# Patient Record
Sex: FEMALE | Race: WHITE
[De-identification: names, ages, dates, MRNs, and addresses within clinical notes are randomized per-mention and may not be internally consistent; named-entity substitution may affect disease eponyms.]

---

## 2017-04-10 ENCOUNTER — HOSPITAL ENCOUNTER (OUTPATIENT)
Dept: HOSPITAL 75 - RAD | Age: 72
End: 2017-04-10
Attending: FAMILY MEDICINE
Payer: MEDICARE

## 2017-04-10 DIAGNOSIS — M25.552: ICD-10-CM

## 2017-04-10 DIAGNOSIS — M54.5: Primary | ICD-10-CM

## 2017-04-10 PROCEDURE — 72100 X-RAY EXAM L-S SPINE 2/3 VWS: CPT

## 2017-04-10 PROCEDURE — 72202 X-RAY EXAM SI JOINTS 3/> VWS: CPT

## 2017-04-10 PROCEDURE — 73502 X-RAY EXAM HIP UNI 2-3 VIEWS: CPT

## 2017-04-10 NOTE — DIAGNOSTIC IMAGING REPORT
Three views of the SI joints.



INDICATION: Pain.



FINDINGS: There are mild degenerative sclerotic changes at the

SI joints and vacuum phenomenon. No fracture or dislocation

seen. Overlying metallic foreign bodies are verified by the

x-ray technologist as on the patient's pants.



IMPRESSION: Mild degenerative changes.



Dictated by:



Dictated on workstation # [id number]

## 2017-04-10 NOTE — DIAGNOSTIC IMAGING REPORT
Three views of the lumbar spine.



INDICATION: Back pain.



FINDINGS: The alignment of the posterior spinal line is

satisfactory. Vertebral body heights are preserved. There is

moderate disc height loss at mid lumbar spine levels most

prominent at the L4-L5 and L2-L3 levels. Prominent anterior

osteophytes are seen. No significant posterior osteophytes.

Mild-to-moderate degenerative changes of the SI joints noted.



IMPRESSION: Disc degenerative changes.



Dictated by:



Dictated on workstation # [id number]

## 2017-04-10 NOTE — DIAGNOSTIC IMAGING REPORT
EXAMINATION: Two views of the left hip.



INDICATION: Left hip pain.



FINDINGS: There is no fracture, dislocation or radiopaque

foreign body. Minimal subchondral sclerosis is seen. No

significant osteophyte formation or joint space narrowing. There

are metallic foreign bodies overlapping from the patient's pants

as verified by the x-ray technologist who took the exam.



IMPRESSION: Minimal degenerative changes.



Dictated by:



Dictated on workstation # [id number]

## 2017-05-17 ENCOUNTER — HOSPITAL ENCOUNTER (OUTPATIENT)
Dept: HOSPITAL 75 - RAD | Age: 72
End: 2017-05-17
Attending: NURSE PRACTITIONER
Payer: MEDICARE

## 2017-05-17 DIAGNOSIS — R10.84: Primary | ICD-10-CM

## 2017-05-17 DIAGNOSIS — R14.0: ICD-10-CM

## 2017-05-17 DIAGNOSIS — K57.30: ICD-10-CM

## 2017-05-17 PROCEDURE — 74177 CT ABD & PELVIS W/CONTRAST: CPT

## 2017-05-17 NOTE — DIAGNOSTIC IMAGING REPORT
PROCEDURE: CT abdomen and pelvis with contrast.



TECHNIQUE: Multiple contiguous axial images were obtained through

the abdomen and pelvis after administration of intravenous

contrast. 



INDICATION:  Abdominal pain. Right flank pain.



100 mL of Omnipaque 350 is administered intravenously.



FINDINGS:

The lung bases demonstrate minimal atelectasis bilaterally.



The liver, the gallbladder, the spleen, the adrenals, and the

pancreas appear unremarkable. The abdominal aorta is normal in

caliber with no para-aortic significantly enlarged lymph nodes

seen. The kidneys have symmetric enhancement and contrast

excretion. No hydronephrosis.



There is divarication of the recti with mild bulge along the

anterior abdominal wall without a ventral hernia. There is no

free fluid or fluid collection in the abdomen or pelvis seen.

There is diverticulosis. No significant inflammatory changes are

seen to suggest the presence of diverticulitis. There is no bowel

obstruction. There is suggestion of prior hysterectomy.



The osseous structures demonstrate prominent degenerative changes

of the lower lumbar spine and SI joints.



IMPRESSION: Diverticulosis. No diverticulitis.



Dictated by: 



  Dictated on workstation # [id number]

## 2018-03-11 ENCOUNTER — HOSPITAL ENCOUNTER (EMERGENCY)
Dept: HOSPITAL 75 - ER | Age: 73
Discharge: HOME | End: 2018-03-11
Payer: MEDICARE

## 2018-03-11 VITALS — SYSTOLIC BLOOD PRESSURE: 138 MMHG | DIASTOLIC BLOOD PRESSURE: 76 MMHG

## 2018-03-11 VITALS — BODY MASS INDEX: 32.44 KG/M2 | HEIGHT: 64 IN | WEIGHT: 190 LBS

## 2018-03-11 DIAGNOSIS — J06.9: Primary | ICD-10-CM

## 2018-03-11 DIAGNOSIS — I10: ICD-10-CM

## 2018-03-11 DIAGNOSIS — Z90.89: ICD-10-CM

## 2018-03-11 DIAGNOSIS — Z90.710: ICD-10-CM

## 2018-03-11 DIAGNOSIS — E78.00: ICD-10-CM

## 2018-03-11 DIAGNOSIS — Z79.52: ICD-10-CM

## 2018-03-11 LAB
ALBUMIN SERPL-MCNC: 4.3 GM/DL (ref 3.2–4.5)
ALP SERPL-CCNC: 116 U/L (ref 40–136)
ALT SERPL-CCNC: 20 U/L (ref 0–55)
BASOPHILS # BLD AUTO: 0 10^3/UL (ref 0–0.1)
BASOPHILS NFR BLD AUTO: 0 % (ref 0–10)
BILIRUB SERPL-MCNC: 0.7 MG/DL (ref 0.1–1)
BUN/CREAT SERPL: 17
CALCIUM SERPL-MCNC: 9.8 MG/DL (ref 8.5–10.1)
CHLORIDE SERPL-SCNC: 104 MMOL/L (ref 98–107)
CO2 SERPL-SCNC: 27 MMOL/L (ref 21–32)
CREAT SERPL-MCNC: 1.05 MG/DL (ref 0.6–1.3)
EOSINOPHIL # BLD AUTO: 0.1 10^3/UL (ref 0–0.3)
EOSINOPHIL NFR BLD AUTO: 2 % (ref 0–10)
ERYTHROCYTE [DISTWIDTH] IN BLOOD BY AUTOMATED COUNT: 13.1 % (ref 10–14.5)
GFR SERPLBLD BASED ON 1.73 SQ M-ARVRAT: 51 ML/MIN
GLUCOSE SERPL-MCNC: 114 MG/DL (ref 70–105)
HCT VFR BLD CALC: 38 % (ref 35–52)
HGB BLD-MCNC: 12.9 G/DL (ref 11.5–16)
LYMPHOCYTES # BLD AUTO: 0.7 X 10^3 (ref 1–4)
LYMPHOCYTES NFR BLD AUTO: 8 % (ref 12–44)
MANUAL DIFFERENTIAL PERFORMED BLD QL: NO
MCH RBC QN AUTO: 30 PG (ref 25–34)
MCHC RBC AUTO-ENTMCNC: 34 G/DL (ref 32–36)
MCV RBC AUTO: 88 FL (ref 80–99)
MONOCYTES # BLD AUTO: 0.6 X 10^3 (ref 0–1)
MONOCYTES NFR BLD AUTO: 7 % (ref 0–12)
NEUTROPHILS # BLD AUTO: 6.8 X 10^3 (ref 1.8–7.8)
NEUTROPHILS NFR BLD AUTO: 83 % (ref 42–75)
PLATELET # BLD: 152 10^3/UL (ref 130–400)
PMV BLD AUTO: 10.8 FL (ref 7.4–10.4)
POTASSIUM SERPL-SCNC: 3.7 MMOL/L (ref 3.6–5)
PROT SERPL-MCNC: 7.8 GM/DL (ref 6.4–8.2)
RBC # BLD AUTO: 4.31 10^6/UL (ref 4.35–5.85)
SODIUM SERPL-SCNC: 140 MMOL/L (ref 135–145)
WBC # BLD AUTO: 8.2 10^3/UL (ref 4.3–11)

## 2018-03-11 PROCEDURE — 71045 X-RAY EXAM CHEST 1 VIEW: CPT

## 2018-03-11 PROCEDURE — 85025 COMPLETE CBC W/AUTO DIFF WBC: CPT

## 2018-03-11 PROCEDURE — 36415 COLL VENOUS BLD VENIPUNCTURE: CPT

## 2018-03-11 PROCEDURE — 87804 INFLUENZA ASSAY W/OPTIC: CPT

## 2018-03-11 PROCEDURE — 80053 COMPREHEN METABOLIC PANEL: CPT

## 2018-03-11 NOTE — XMS REPORT
CCD document using C-CDA

 Created on: 2018



Polly Harden

External Reference #: [id number]

: 1945

Sex: Female



Demographics







 [place]  [address] [place]  [address]

 

 Home Phone  [phone]

 

 Preferred Language  English

 

 Marital Status  Unknown

 

 [employer]ism Affiliation  Unknown

 

 Race  White

 

 Ethnic Group  Not  or 





Author







 Author  Madai Anderson

 

 Organization  Madai Anderson MD, [facility]

 

 Address  [address] [place]  [address]



 

 Phone  [phone]







Care Team Providers







 Care Team Member Name  Role  Phone

 

  PP  Unavailable

 

  CCM  Unavailable



                                            



Summary Purpose

          Interface Exchange                                                   
                 



Insurance Providers

                      





 Payer name                    Policy type / Coverage type                    
Covered party ID                    Effective Begin Date                    
Effective End Date                

 

 [employer] Medicare Part B                    Medicare Part B                    
[id number]                    Unknown                    Unknown                

 

 [employer]                    Medicare Part B                    
[id number]                    Unknown                    Unknown                



                                                                               
         



Family history

                      



Mother            





 Diagnosis                    Age At Onset                

 

 Diabetes mellitus Type 2                    Unknown                

 

 Stroke                    Unknown                



            



Father            





 Diagnosis                    Age At Onset                

 

 Heart Attack                    Unknown                



                                                                               
         



Social History

                      





 Social History Element                    Codes                    Description
                    Effective Dates                

 

 Marital status                    Unknown                              
          2015                

 

 Number of children                    Unknown                    2            
        2015                

 

 Tobacco history                    SNOMED CT: 223109960                    
Never smoker                    2015                

 

 Alcohol history                    SNOMED CT: 535309335                    
Never drinks alcohol                    2015                



                                                                               
                                       



Allergies, Adverse Reactions, Alerts

          Allergies, Adverse Reactions, Alerts data not found                  
                                                  



Past Medical History

                      





 Illness                    Codes                    Condition Status          
          Onset Date                    Resolved Date                

 

                     Discoid lupus erythematosus                               
       ICD-9: 695.4

ICD-10: L93.0                    Active                    10/02/2016          
          Unknown                

 

                     Essential (primary) hypertension                          
            ICD-9: 401.1

ICD-10: I10                    Active                    04/10/2017            
        Unknown                

 

                     Encounter for immunization                                
      ICD-9: V03.9

ICD-10: Z23                    Active                    10/16/2017            
        Unknown                

 

                     Mixed hyperlipidemia                                      
ICD-9: 272.2

ICD-10: E78.2                    Active                    04/10/2017          
          Unknown                

 

                     Morbid (severe) obesity due to excess calories            
                          ICD-9: 278.01

ICD-10: E66.01                    Active                    10/16/2017         
           Unknown                

 

                     Other forms of dyspnea                                    
  ICD-9: 786.09

ICD-10: R06.09                    Active                    2017         
           Unknown                

 

                     Generalized abdominal pain                                
      ICD-9: 789.07

ICD-10: R10.84                    Active                    2017         
           Unknown                

 

                     Low back pain                                      ICD-9: 
724.2

ICD-10: M54.5                    Active                    04/10/2017          
          Unknown                

 

                     Other organ or system involvement in systemic lupus 
erythematosus                                      ICD-9: 710.0

ICD-10: M32.19                    Active                    2017         
           Unknown                

 

                     Abnormal uterine and vaginal bleeding, unspecified        
                              ICD-9: 623.8

ICD-10: N93.9                    Active                    2017          
          Unknown                

 

                     Pain in left hip                                      ICD-9
: 719.45

ICD-10: M25.552                    Active                    04/10/2017        
            Unknown                

 

                     Encounter for general adult medical examination without 
abnormal findings                                      ICD-9: V70.9

ICD-10: Z00.00                    Active                    2017         
           Unknown                

 

                     Essential (primary) hypertension                          
            ICD-9: 401.9

ICD-10: I10                    Active                    2015            
        Unknown                

 

                     Systemic lupus erythematosus, unspecified                 
                     ICD-9: 710.0

ICD-10: M32.9                    Active                    2016          
          Unknown                

 

                     Encounter for screening mammogram for malignant neoplasm 
of breast                                      ICD-9: V76.12

ICD-10: Z12.31                    Active                    2017         
           Unknown                

 

                     Encounter for immunization                                
      ICD-9: V03.82

ICD-10: Z23                    Active                    10/02/2016            
        Unknown                

 

                     Dysphonia                                      ICD-9: 
784.42

ICD-10: R49.0                    Active                    2016          
          Unknown                

 

                     Encounter for immunization                                
      ICD-9: V04.81

ICD-10: Z23                    Active                    2016            
        Unknown                

 

                     Other intestinal malabsorption                            
          ICD-9: 579.8

ICD-10: K90.89                    Active                    2016         
           Unknown                

 

                     Other fecal abnormalities                                 
     ICD-9: 787.7

ICD-10: R19.5                    Active                    2016          
          Unknown                

 

                     Intestinal malabsorption, unspecified                     
                 ICD-9: 579.9

ICD-10: K90.9                    Active                    2016          
          Unknown                

 

                     Medication reaction                                      
ICD-9: 995.20                    Active                    2015          
          Unknown                

 

                     Low back pain                                      ICD-9: 
724.2                    Active                    2015                  
  Unknown                

 

                     Sacroiliitis                                      ICD-9: 
720.2                    Active                    2015                  
  Unknown                

 

                     Hypertension                                      Unknown 
                   Active                    2015                    
Unknown                

 

                     ESOPHAGEAL REFLUX                                      ICD-
9: 530.81                    Active                    2015              
      Unknown                

 

                     ESSENTIAL HYPERTENSION                                    
  ICD-9: 401.9                    Active                    2015         
           Unknown                

 

                     Malar rash                                      ICD-9: 
782.1                    Active                    2015                  
  Unknown                

 

                     Rosacea                                      ICD-9: 695.3 
                   Active                    2015                    
Unknown                



                                                                               
                                                                               
                                                                               
                                                                               
                                                    



Problems

                      





 Condition                    Codes                    Effective Dates         
           Condition Status                

 

                     Discoid lupus erythematosus                               
       ICD-9: 695.4

ICD-10: L93.0                    10/02/2016                    Active          
      

 

                     Essential (primary) hypertension                          
            ICD-9: 401.1

ICD-10: I10                    04/10/2017                    Active            
    

 

                     Encounter for immunization                                
      ICD-9: V03.9

ICD-10: Z23                    10/16/2017                    Active            
    

 

                     Mixed hyperlipidemia                                      
ICD-9: 272.2

ICD-10: E78.2                    04/10/2017                    Active          
      

 

                     Morbid (severe) obesity due to excess calories            
                          ICD-9: 278.01

ICD-10: E66.01                    10/16/2017                    Active         
       

 

                     Other forms of dyspnea                                    
  ICD-9: 786.09

ICD-10: R06.09                    2017                    Active         
       

 

                     Generalized abdominal pain                                
      ICD-9: 789.07

ICD-10: R10.84                    2017                    Active         
       

 

                     Low back pain                                      ICD-9: 
724.2

ICD-10: M54.5                    04/10/2017                    Active          
      

 

                     Other organ or system involvement in systemic lupus 
erythematosus                                      ICD-9: 710.0

ICD-10: M32.19                    2017                    Active         
       

 

                     Abnormal uterine and vaginal bleeding, unspecified        
                              ICD-9: 623.8

ICD-10: N93.9                    2017                    Active          
      

 

                     Pain in left hip                                      ICD-9
: 719.45

ICD-10: M25.552                    04/10/2017                    Active        
        

 

                     Encounter for general adult medical examination without 
abnormal findings                                      ICD-9: V70.9

ICD-10: Z00.00                    2017                    Active         
       

 

                     Essential (primary) hypertension                          
            ICD-9: 401.9

ICD-10: I10                    2015                    Active            
    

 

                     Systemic lupus erythematosus, unspecified                 
                     ICD-9: 710.0

ICD-10: M32.9                    2016                    Active          
      

 

                     Encounter for screening mammogram for malignant neoplasm 
of breast                                      ICD-9: V76.12

ICD-10: Z12.31                    2017                    Active         
       

 

                     Encounter for immunization                                
      ICD-9: V03.82

ICD-10: Z23                    10/02/2016                    Active            
    

 

                     Dysphonia                                      ICD-9: 
784.42

ICD-10: R49.0                    2016                    Active          
      

 

                     Encounter for immunization                                
      ICD-9: V04.81

ICD-10: Z23                    2016                    Active            
    

 

                     Other intestinal malabsorption                            
          ICD-9: 579.8

ICD-10: K90.89                    2016                    Active         
       

 

                     Other fecal abnormalities                                 
     ICD-9: 787.7

ICD-10: R19.5                    2016                    Active          
      

 

                     Intestinal malabsorption, unspecified                     
                 ICD-9: 579.9

ICD-10: K90.9                    2016                    Active          
      

 

                     Medication reaction                                      
ICD-9: 995.20                    2015                    Active          
      

 

                     Low back pain                                      ICD-9: 
724.2                    2015                    Active                

 

                     Sacroiliitis                                      ICD-9: 
720.2                    2015                    Active                

 

                     Hypertension                                      Unknown 
                   2015                    Active                

 

                     ESOPHAGEAL REFLUX                                      ICD-
9: 530.81                    2015                    Active              
  

 

                     ESSENTIAL HYPERTENSION                                    
  ICD-9: 401.9                    2015                    Active         
       

 

                     Malar rash                                      ICD-9: 
782.1                    2015                    Active                

 

                     Rosacea                                      ICD-9: 695.3 
                   2015                    Active                



                                                                               
                                                                               
                                                                               
                                                                               
                                                    



Medications

                      





 Medication                    Codes                    Instructions           
         Start Date                    Stop Date                    Status     
               Fill Instructions                

 

                     hydroxychloroquine 200 mg tablet                          
            RxNorm: 894612                    1 Tablet(s) PO BID               
     2017                    Active        
                            

 

                     atenolol 50 mg tablet                                      
RxNorm: 655200                    1 Tablet(s) PO daily                    2018                    Active                   
                 

 

                     sertraline 50 mg tablet                                   
   RxNorm: 244963                    TAKE 1 TABLET EVERY DAY                    
10/13/2017                    10/07/2018                    Active             
                       

 

                     pravastatin 20 mg tablet                                  
    RxNorm: 131074                    1 Tablet(s) PO daily                    04
/10/2017                    2018                    Active               
                     

 

                     ipratropium bromide 0.06 % nasal spray                    
                  RxNorm: 4790822                    2 Spray NASAL daily       
             2017                    Active
                                    

 

                     ipratropium bromide 0.06 % nasal spray                    
                  RxNorm: 0542685                    2 Spray NASAL daily       
             2017                    
Inactive                                    

 

                     losartan 50 mg tablet                                      
RxNorm: 336872                    1 Tablet(s) PO QPM                    2018                    Active                   
                 

 

                     ipratropium bromide 0.06 % nasal spray                    
                  RxNorm: 6902308                    2 Spray NASAL             
       2017                    Inactive    
                                

 

                     atenolol 50 mg tablet                                      
RxNorm: 745298                    1 Tablet(s) PO daily                    2017                    Inactive                 
                   

 

                     isosorbide mononitrate ER 30 mg tablet,extended release 24 
hr                                      RxNorm: 518366                    2 
Tablet(s) PO daily                    10/03/2016                    2017 
                   Inactive                                    

 

                     sertraline 50 mg tablet                                   
   RxNorm: 145326                    1 Tablet(s) PO daily                    2017                    Inactive              
                      

 

                     dicyclomine 10 mg capsule                                 
     RxNorm: 603777                    1 Capsule(s) PO QID as needed abdominal 
pain                    2016                    10/02/2016               
     Inactive                                    

 

                     Questran 4 gram oral powder                               
       RxNorm: 733448                    1 packet PO BID as needed before meals 
to help prevent diarrhea associated with meals                    2016                    Inactive                        
            

 

                     Pepcid AC 20 mg tablet                                    
  RxNorm: 277668                    1 Tablet(s) PO BID                    2016                    Inactive                 
   [SAVINGS FOR NON-COVERED DRUGS -- BIN:[id number], PCN: [id number], Group: XXXXX, ID
# [id number],  Questions: [phone].  THIS IS NOT INSURANCE.]                

 

                     Co Q-10 200 mg capsule                                    
  RxNorm: 367575                    1 Capsule(s) PO daily                    2016                    Inactive              
                      

 

                     metronidazole 0.75 % topical gel                          
            RxNorm: 283358                    1 Application TOP QPM            
        2015                    Inactive   
                 [SAVINGS FOR NON-COVERED DRUGS -- BIN:[id number], PCN: [id number], 
Group: XXXXX, ID# [id number],  Questions: [phone].  THIS IS NOT INSURANCE.]
                

 

                     Vitamin D3 2,000 unit capsule                             
         RxNorm: 822740                    1 Capsule(s) PO daily               
     No Start Date                                         Active              
                      

 

                     tizanidine 2 mg tablet                                    
  RxNorm: 464901                    1 Tablet(s) PO QHS as needed               
     No Start Date                                         Active              
                      

 

                     hydroxychloroquine 200 mg tablet                          
            RxNorm: 777630                    1 Tablet(s) PO BID               
     No Start Date                    2017                    Inactive   
                                 

 

                     pravastatin 20 mg tablet                                  
    RxNorm: 039866                    1 Tablet(s) PO daily                    
No Start Date                    2017                    Inactive        
                            

 

                     atenolol 50 mg tablet                                      
RxNorm: 817721                    1 Tablet(s) PO daily                    No 
Start Date                    2017                    Inactive           
                         

 

                     sertraline 50 mg tablet                                   
   RxNorm: 397591                    1 Tablet(s) PO daily                    No 
Start Date                    2016                    Inactive           
                         

 

                     isosorbide mononitrate ER 30 mg tablet,extended release 24 
hr                                      RxNorm: 341591                    1 
Tablet(s) PO BID                    No Start Date                    2016
                    Inactive                                    

 

                     ipratropium bromide 0.06 % nasal spray                    
                  RxNorm: 6923614                    2 Spray NASAL             
       No Start Date                    2017                    Inactive 
                                   

 

                     lisinopril 20 mg tablet                                   
   RxNorm: 071551                    1 Tablet(s) PO BID                    No 
Start Date                    2016                    Inactive           
                         

 

                     Vitamin B                                      RxNorm:    
                 1 Tablet(s) PO daily                    No Start Date         
           2016                    Inactive                              
      

 

                     isosorbide (bulk) 98 % powder                             
         RxNorm:                     miscellaneous                    No Start 
Date                    2015                    Inactive                 
                   



                                                                               
                                                                               
                                                                               
                                                                               
                      



Medication Administered

          No Medication Administered data                                      
                              



Immunizations

                      





 Vaccine                    Codes                    Date                    
Status                

 

 Influenza                    CVX: 141                    10/16/2017           
         completed                

 

 Pneumococcal (Adult)                    CVX: 133                    10/03/2016
                    completed                

 

 Influenza                    CVX: 141                    2016           
         completed                



                                                                               
                   



Assessments

                      





 Condition                    Codes                    Effective Dates         
       

 

 Essential (primary) hypertension                    ICD-10: I10

ICD-9: 401.1                    2018                

 

 Discoid lupus erythematosus                    ICD-10: L93.0

ICD-9: 695.4                    2018                

 

 Morbid (severe) obesity due to excess calories                    ICD-10: 
E66.01

ICD-9: 278.01                    10/16/2017                

 

 Other forms of dyspnea                    ICD-10: R06.09

ICD-9: 786.09                    10/16/2017                

 

 Mixed hyperlipidemia                    ICD-10: E78.2

ICD-9: 272.2                    10/16/2017                

 

 Encounter for immunization                    ICD-10: Z23

ICD-9: V03.9                    10/16/2017                

 

 Generalized abdominal pain                    ICD-10: R10.84

ICD-9: 789.07                    2017                

 

 Other organ or system involvement in systemic lupus erythematosus             
       ICD-10: M32.19

ICD-9: 710.0                    2017                

 

 Low back pain                    ICD-10: M54.5

ICD-9: 724.2                    2017                

 

 Abnormal uterine and vaginal bleeding, unspecified                    ICD-10: 
N93.9

ICD-9: 623.8                    2017                

 

 Pain in left hip                    ICD-10: M25.552

ICD-9: 719.45                    04/10/2017                

 

 Encounter for general adult medical examination without abnormal findings     
               ICD-10: Z00.00

ICD-9: V70.9                    2017                

 

 Essential (primary) hypertension                    ICD-10: I10

ICD-9: 401.9                    2017                

 

 Encounter for screening mammogram for malignant neoplasm of breast            
        ICD-10: Z12.31

ICD-9: V76.12                    2017                

 

 Encounter for immunization                    ICD-10: Z23

ICD-9: V03.82                    10/03/2016                

 

 Dysphonia                    ICD-10: R49.0

ICD-9: 784.42                    2016                

 

 Other intestinal malabsorption                    ICD-10: K90.89

ICD-9: 579.8                    2016                

 

 Encounter for immunization                    ICD-10: Z23

ICD-9: V04.81                    2016                

 

 Other fecal abnormalities                    ICD-10: R19.5

ICD-9: 787.7                    2016                

 

 Systemic lupus erythematosus, unspecified                    ICD-10: M32.9

ICD-9: 710.0                    2016                

 

 Intestinal malabsorption, unspecified                    ICD-10: K90.9

ICD-9: 579.9                    2016                

 

 ESSENTIAL HYPERTENSION                    ICD-9: 401.9                                    

 

 Medication reaction                    ICD-9: 995.20                    2015                

 

 Low back pain                    ICD-9: 724.2                    2015   
             

 

 Sacroiliitis                    ICD-9: 720.2                    2015    
            

 

 Malar rash                    ICD-9: 782.1                    2015      
          

 

 ESOPHAGEAL REFLUX                    ICD-9: 530.81                    2015                

 

 Rosacea                    ICD-9: 695.3                    2015         
       



                                                                    



Reason For Visit

                      





 Reason For Visit                    Effective Dates                    Notes  
              

 

 dyspnea                    2018                                    

 

 dyspnea                    10/16/2017                                    

 

 dyspnea                    2017                                    

 

 vaginal bleeding                    2017                                
    

 

 dyspnea                    04/10/2017                                    

 

 Annual Medicare Wellness Exam                    2017                   
                 

 

 diarrhea                    2017                                    

 

 diarrhea                    10/03/2016                                    

 

 diarrhea                    2016                                    

 

 diarrhea                    2016                                    

 

 hypertension                    2016                                    

 

 ~generic                    2015                    feels hot all of the 
time                

 

 back pain                    2015                                    

 

 _                    2015                    here to discuss labs.      
           

 

 rash                    2015                    onset fall              
   



                                                                              



Results

                      





 Observation                    Observation Code                    Item       
             Item Code                    Result                    Date       
         

 

 Comp Metabolic                    Cku036                    NA                
                        141 mEq/L                    2017                

 

 Comp Metabolic                    Yxk079                    K                 
                       3.8 mEq/L                    2017                

 

 Comp Metabolic                    Dzz141                    CL                
                        104 mEq/L                    2017                

 

 Comp Metabolic                    Wjz120                    CO2               
                         27.0 mEq/L                    2017              
  

 

 Comp Metabolic                    Vqy567                    ANION GAP         
                               14                     2017                

 

 Comp Metabolic                    Jgo739                    GLUCOSE           
                             84 mg/dL                    2017            
    

 

 Comp Metabolic                    Uqw955                    Creat             
                           1.0 mg/dL                    2017             
   

 

 Comp Metabolic                    Lqm545                    eGFR              
                          58 ml/min/1.73m2                    2017       
         

 

 Comp Metabolic                    Hkc725                    BUN               
                         18 mg/dL                    2017                

 

 Comp Metabolic                    Wdj076                    B/C Ratio         
                               18.0 Ratio                    2017        
        

 

 Comp Metabolic                    Ksq718                    CALCIUM           
                             9.1 mg/dL                    2017           
     

 

 Comp Metabolic                    Ebg013                    ALK PHOS          
                              92 U/L                    2017             
   

 

 Comp Metabolic                    Ssh626                    AST(SGOT)         
                               28 U/L                    2017            
    

 

 Comp Metabolic                    Khc067                    ALT(SGPT)         
                               17 U/L                    2017            
    

 

 Comp Metabolic                    Obx245                    BILI T            
                            0.7 mg/dL                    2017            
    

 

 Comp Metabolic                    Ssw392                    ALBUMIN           
                             4.1 g/dL                    2017            
    

 

 Comp Metabolic                    Ooj244                    TPRO              
                          6.9 g/dL                    2017                

 

 Comp Metabolic                    Rwh227                    GLOB              
                          2.8 g/dL                    2017                

 

 Comp Metabolic                    Dvz661                    A/G Ratio         
                               1.5 Ratio                    2017         
       

 

 Comp Metabolic                    Azd246                    Osmo              
                          282 mOsmo                    2017              
  

 

 LIPID GRP                                        CHOLESTEROL           
                             186 mg/dL                    2017           
     

 

 LIPID GRP                                        Triglyceride          
                              146 mg/dL                    2017          
      

 

 LIPID GRP                                        HDL CHOLESTEROL       
                                 59 mg/dL                    2017        
        

 

 LIPID GRP                    0094449                    Chol/HDL Ratio        
                                3.15 ratio                    2017       
         

 

 LIPID GRP                    7331522                    NON-HDL Chol          
                              127 mg/dL                    2017          
      

 

 LIPID GRP                                        LDL Cholesterol       
                                 98 mg/dL                    2017        
        

 

 TSH                    4500446                    TSH                         
               1.120 uIU/mL                    2017                

 

 CBC                    4833199                    WBC                         
               7.6 10e9/L                    2017                

 

 CBC                    6576454                    RBC                         
               4.14 10e12/L                    2017                

 

 CBC                    9750903                    HEMOGLOBIN                  
                      12.2 g/dL                    2017                

 

 CBC                    0747656                    HEMATOCRIT                  
                      36.6 %                    2017                

 

 CBC                    3158114                    MCV                         
               88.4 fL                    2017                

 

 CBC                    5094558                    MCH                         
               29.5 pg                    2017                

 

 CBC                    6349025                    MCHC                        
                33.3 g/dL                    2017                

 

 CBC                    3293941                    PLATELET COUNT              
                          172 10e9/L                    2017             
   

 

 CBC                    8001389                    Mean Plt Volume             
                           10.8 fL                    2017                

 

 CBC                    5988951                    Neut Auto                   
                     61.5 %                    2017                

 

 CBC                    0988127                    Lymph Auto                  
                      26.5 %                    2017                

 

 CBC                    8728627                    Mono Auto                   
                     8.8 %                    2017                

 

 CBC                    7308209                    Eos Auto                    
                    2.9 %                    2017                

 

 CBC                    7536815                    RDW                         
               13.2 %                    2017                

 

 CBC                    6213734                    Baso Auto                   
                     0.3 %                    2017                

 

 CBC                    0108446                    Neutrophil Abs              
                          4.67 10e9/L                    2017            
    

 

 CBC                    2290581                    Lymphocyte Abs              
                          2.01 10e9/L                    2017            
    

 

 CBC                    7680917                    Monocyte Abs                
                        0.67 10e9/L                    2017              
  

 

 CBC                    3723015                    Eosinophil Abs              
                          0.22 10e9/L                    2017            
    

 

 CBC                    7737879                    RDW-SD                      
                  41.4 fL                    2017                

 

 CBC                    1636442                    Basophil Abs                
                        0.02 10e9/L                    2017              
  

 

 GFR CALC                    0945689                    GFR Non Afr Amr        
                                44 mL/min                    2017        
        

 

 GFR CALC                    7843017                    GFR Afr Amr            
                            53 mL/min                    2017            
    

 

 CHEM 14                    0204005                    AST                     
                   21 U/L                    2017                

 

 CHEM 14                    1165360                    ALT                     
                   13 U/L                    2017                

 

 CHEM 14                    5588897                    BUN                     
                   24 mg/dL                    2017                

 

 CHEM 14                    6613258                    ALBUMIN                 
                       4.3 g/dL                    2017                

 

 CHEM 14                    7221854                    CHLORIDE                
                        105 mmol/L                    2017                

 

 CHEM 14                    4577137                    Bili Total              
                          0.6 mg/dL                    2017              
  

 

 CHEM 14                    7547957                    ALK PHOS                
                        86 U/L                    2017                

 

 CHEM 14                    5393889                    SODIUM                  
                      140 mmol/L                    2017                

 

 CHEM 14                    9560689                    CREATININE              
                          1.21 mg/dL                    2017             
   

 

 CHEM 14                    3738501                    CALCIUM                 
                       9.8 mg/dL                    2017                

 

 CHEM 14                    6346560                    POTASSIUM               
                         3.9 mmol/L                    2017              
  

 

 CHEM 14                    3820872                    TOTAL PROTEIN           
                             7.5 g/dL                    2017            
    

 

 CHEM 14                    9963497                    GLUCOSE                 
                       92 mg/dL                    2017                

 

 CHEM 14                    5705988                    Bicarbonate             
                           27 mmol/L                    2017             
   

 

 CHEM 14                    0037572                    AGAP                    
                    8 mmol/L                    2017                

 

 TSH                    7805029                    TSH                         
               1.394 uIU/ML                    2016                

 

 CBC                    5807894                    WBC                         
               7.7 10e9/L                    2016                

 

 CBC                    5378793                    RBC                         
               4.21 10e12/L                    2016                

 

 CBC                    4839301                    HGB                         
               12.3 g/dL                    2016                

 

 CBC                    2428074                    HCT DET                     
                   37.3 %                    2016                

 

 CBC                    1822339                    MCV                         
               88.6 fL                    2016                

 

 CBC                    2377163                    MCH                         
               29.2 pg                    2016                

 

 CBC                    2550490                    MCHC                        
                33.0 g/dL                    2016                

 

 CBC                    7136583                    PLT                         
               156 10e9/L                    2016                

 

 CBC                    1899925                    MPV                         
               10.6 fL                    2016                

 

 CBC                    4947537                    DEO %                       
                 55.1 %                    2016                

 

 CBC                    7605049                    LY %                        
                35.1 %                    2016                

 

 CBC                    7233748                    MON %                       
                 6.9 %                    2016                

 

 CBC                    6446787                    EOS  %                      
                  2.6 %                    2016                

 

 CBC                    8340072                    BASO %                      
                  0.3 %                    2016                

 

 CBC                    2819627                    RDW                         
               12.9 %                    2016                

 

 CBC                    0763478                    ABS DEO                     
                   4.24 10e9/L                    2016                

 

 CBC                    4306494                    ABS LYMPH                   
                     2.70 10e9/L                    2016                

 

 CBC                    1290414                    ABS MONO                    
                    0.53 10e9/L                    2016                

 

 CBC                    4791360                    ABS EOS                     
                   0.20 10e9/L                    2016                

 

 CBC                    6729671                    ABS BASO                    
                    0.02 10e9/L                    2016                

 

 CBC                    3128114                    RDW-SD                      
                  40.8 fL                    2016                

 

 CHEM 14                    7058298                    AST                     
                   19 U/L                    2016                

 

 CHEM 14                    9551188                    ALT                     
                   10 IU/L                    2016                

 

 CHEM 14                    1453887                    BUN                     
                   18 MG/DL                    2016                

 

 CHEM 14                    2474261                    ALBUMIN                 
                       4.3 GM/DL                    2016                

 

 CHEM 14                    9237049                    CHLORIDE                
                        105 MMOL/L                    2016                

 

 CHEM 14                    9387705                    BILI TOT                
                        0.4 MG/DL                    2016                

 

 CHEM 14                    5242601                    ALK PHOS                
                        84 U/L                    2016                

 

 CHEM 14                    8787217                    SODIUM                  
                      141 MMOL/L                    2016                

 

 CHEM 14                    4382493                    CREATININE              
                          1.12 MG/DL                    2016             
   

 

 CHEM 14                    3442163                    CALCIUM                 
                       9.5 MG/DL                    2016                

 

 CHEM 14                    4447905                    POTASSIUM               
                         3.6 MMOL/L                    2016              
  

 

 CHEM 14                    9191869                    PROT TOT                
                        7.5 GM/DL                    2016                

 

 CHEM 14                    7698824                    GLUCOSE                 
                       88 MG/DL                    2016                

 

 CHEM 14                    7719954                    BICARB                  
                      29 MMOL/L                    2016                

 

 CHEM 14                    5358669                    ANION GAP               
                         7 MEQ/L                    2016                

 

 LIPID GRP                                        HDL TEST              
                          67 MG/DL                    2016                

 

 LIPID GRP                                        TRIG                  
                      135 MG/DL                    2016                

 

 LIPID GRP                                        TEST LDL              
                          104 MG/DL                    2016              
  

 

 LIPID GRP                                        CHOL                  
                      198 MG/DL                    2016                

 

 LIPID GRP                                        RCHOL/HDL             
                           2.96 RATIO                    2016            
    

 

 LIPID GRP                                        NON-HDL CH            
                            131 MG/DL                    2016            
    

 

 GFR CALC                    0573092                    GFR AA                 
                       58.0L ML/MIN                    2016              
  

 

 GFR CALC                    2910322                    GFR NON-AA             
                           48.0L ML/MIN                    2016          
      

 

 GPI BETA 2                    9124847                    BETA 2 IGG           
                             1.5 SGU                    2015             
   

 

 GPI BETA 2                    4797349                    BETA 2 IGM           
                             2.8 SMU                    2015             
   

 

 CARDIO G/M                    2603396                    CARDIO IGG           
                             5.4 GPLU                    2015            
    

 

 CARDIO G/M                    0120277                    CARDIO IGM           
                             21.8 MPLU                    2015           
     

 

 TITER ADITI                    8538224                    TITR ADITI              
                          1:320                     2015                

 

 TITER ADITI                    [id number]                    PATTERN               
                         HOMOGENS                     2015                

 

 TITER ADITI                    8353417                    ADITI 2                 
                       1:320                     2015                

 

 TITER ADITI                    [id number]                    PATTERN 2             
                           SPECKLED                     2015             
   

 

 C4                    6392135                    C4                           
             39 MG/DL                    2015                

 

 C3                    8446966                    C3                           
             136 MG/DL                    2015                

 

 ADITI SCR                    [id number]                    ADITI SCR                 
                       POSITIVE                     2015                

 

 CHEM 14                    [id number]                    AST                     
                   22 U/L                    2015                

 

 CHEM 14                    [id number]                    ALT                     
                   13 IU/L                    2015                

 

 CHEM 14                    [id number]                    BUN                     
                   19 MG/DL                    2015                

 

 CHEM 14                    [id number]                    ALBUMIN                 
                       4.3 GM/DL                    2015                

 

 CHEM 14                    [id number]                    CHLORIDE                
                        106 MMOL/L                    2015                

 

 CHEM 14                    [id number]                    BILI TOT                
                        0.4 MG/DL                    2015                

 

 CHEM 14                    [id number]                    ALK PHOS                
                        87 U/L                    2015                

 

 CHEM 14                    [id number]                    SODIUM                  
                      140 MMOL/L                    2015                

 

 CHEM 14                    [id number]                    CREATININE              
                          1.02 MG/DL                    2015             
   

 

 CHEM 14                    [id number]                    CALCIUM                 
                       9.1 MG/DL                    2015                

 

 CHEM 14                    [id number]                    POTASSIUM               
                         3.8 MMOL/L                    2015              
  

 

 CHEM 14                    [id number]                    PROT TOT                
                        7.0 GM/DL                    2015                

 

 CHEM 14                    [id number]                    GLUCOSE                 
                       90 MG/DL                    2015                

 

 CHEM 14                    [id number]                    BICARB                  
                      29 MMOL/L                    2015                

 

 CHEM 14                    [id number]                    ANION GAP               
                         5 MEQ/L                    2015                

 

 FREE T4                    [id number]                    FREE T4                 
                       1.05 NG/DL                    2015                

 

 CRP                    [id number]                    CRP                         
               0.6 MG/DL                    2015                

 

 TSH                    [id number]                    TSH                         
               0.609 uIU/ML                    2015                

 

 GFR CALC                    [id number]                    GFR AA                 
                       >60 ML/MIN                    2015                

 

 GFR CALC                    [id number]                    GFR NON-AA             
                           54.0L ML/MIN                    2015          
      

 

 ESR                    [id number]                    ESR                         
               31 MM/HR                    2015                

 

 CBC                    [id number]                    WBC                         
               5.8 10e9/L                    2015                

 

 CBC                    [id number]                    RBC                         
               4.06 10e12/L                    2015                

 

 CBC                    [id number]                    HGB                         
               12.0 g/dL                    2015                

 

 CBC                    [id number]                    HCT DET                     
                   35.8 %                    2015                

 

 CBC                    [id number]                    MCV                         
               88.2 fL                    2015                

 

 CBC                    [id number]                    MCH                         
               29.6 pg                    2015                

 

 CBC                    [id number]                    MCHC                        
                33.5 g/dL                    2015                

 

 CBC                    [id number]                    PLT                         
               164 10e9/L                    2015                

 

 CBC                    [id number]                    MPV                         
               9.9 fL                    2015                

 

 CBC                    [id number]                    DEO %                       
                 57.6 %                    2015                

 

 CBC                    [id number]                    LY %                        
                33.2 %                    2015                

 

 CBC                    [id number]                    MON %                       
                 6.9 %                    2015                

 

 CBC                    [id number]                    EOS  %                      
                  2.1 %                    2015                

 

 CBC                    [id number]                    BASO %                      
                  0.2 %                    2015                

 

 CBC                    [id number]                    RDW                         
               14.2 %                    2015                

 

 CBC                    [id number]                    ABS DEO                     
                   3.34 10e9/L                    2015                

 

 CBC                    [id number]                    ABS LYMPH                   
                     1.93 10e9/L                    2015                

 

 CBC                    [id number]                    ABS MONO                    
                    0.40 10e9/L                    2015                

 

 CBC                    [id number]                    ABS EOS                     
                   0.12 10e9/L                    2015                

 

 CBC                    [id number]                    ABS BASO                    
                    0.01 10e9/L                    2015                

 

 CBC                    [id number]                    RDW-SD                      
                  45.0 fL                    2015                



                                                                               
                                                                               
                                                                               
                                                                       



Review of Systems

                      





 System                    Result                    Effective Dates           
     

 

 Constitutional                    No recent illness                    2018                

 

 Constitutional                    No fatigue                    2018    
            

 

 Constitutional                    No fever                    2018      
          

 

 Constitutional                    No insomnia                    2018   
             

 

 Eyes                    No eye erythema                    2018         
       

 

 Ears/Nose/Throat/Neck                    No headache                    2018                

 

 Cardiovascular                    No chest pain/pressure                                    

 

 Cardiovascular                    No edema                    2018      
          

 

 Cardiovascular                    hypertension                    2018  
              

 

 Cardiovascular                    No near-syncope/dizziness                    
2018                

 

 Cardiovascular                    No syncope                    2018    
            

 

 Respiratory                    No productive sputum                    2018                

 

 Respiratory                    No chest congestion                    2018                

 

 Respiratory                    No chest tightness                    2018                

 

 Respiratory                    No cough                    2018         
       

 

 Respiratory                    No dyspnea                    2018       
         

 

 Gastrointestinal                    No abdominal pain                    2018                

 

 Gastrointestinal                    dyspepsia                    2018   
             

 

 Gastrointestinal                    gastroesophageal reflux                    
2018                

 

 Musculoskeletal                    No joint complaint                    2018                

 

 Neurologic                    No alteration of consciousness                  
  2018                

 

 Psychiatric                    No anxiety                    2018       
         

 

 Psychiatric                    No depression                    2018    
            

 

 Endocrine                    sweating                    2018           
     

 

 Constitutional                    No recent illness                    10/16/
2017                

 

 Constitutional                    No fatigue                    10/16/2017    
            

 

 Constitutional                    No fever                    10/16/2017      
          

 

 Constitutional                    No insomnia                    10/16/2017   
             

 

 Eyes                    No eye erythema                    10/16/2017         
       

 

 Ears/Nose/Throat/Neck                    No headache                    10/16/
2017                

 

 Cardiovascular                    No chest pain/pressure                    10/
                

 

 Cardiovascular                    No edema                    10/16/2017      
          

 

 Cardiovascular                    hypertension                    10/16/2017  
              

 

 Cardiovascular                    No near-syncope/dizziness                    
10/16/2017                

 

 Cardiovascular                    No syncope                    10/16/2017    
            

 

 Respiratory                    No productive sputum                    10/16/
2017                

 

 Respiratory                    No chest congestion                    10/16/
2017                

 

 Respiratory                    No chest tightness                    10/16/
2017                

 

 Respiratory                    No cough                    10/16/2017         
       

 

 Respiratory                    No dyspnea                    10/16/2017       
         

 

 Gastrointestinal                    No abdominal pain                    10/16/
2017                

 

 Gastrointestinal                    dyspepsia                    10/16/2017   
             

 

 Gastrointestinal                    gastroesophageal reflux                    
10/16/2017                

 

 Musculoskeletal                    No joint complaint                    10/16/
2017                

 

 Neurologic                    No alteration of consciousness                  
  10/16/2017                

 

 Psychiatric                    No anxiety                    10/16/2017       
         

 

 Psychiatric                    No depression                    10/16/2017    
            

 

 Endocrine                    sweating                    10/16/2017           
     

 

 Constitutional                    No recent illness                    2017                

 

 Constitutional                    No fatigue                    2017    
            

 

 Constitutional                    No fever                    2017      
          

 

 Constitutional                    No insomnia                    2017   
             

 

 Eyes                    No eye discharge                    2017        
        

 

 Eyes                    No eye erythema                    2017         
       

 

 Ears/Nose/Throat/Neck                    No headache                    2017                

 

 Cardiovascular                    No chest pain/pressure                    2017                

 

 Cardiovascular                    No edema                    2017      
          

 

 Cardiovascular                    hypertension                    2017  
              

 

 Cardiovascular                    No near-syncope/dizziness                    
2017                

 

 Cardiovascular                    No syncope                    2017    
            

 

 Respiratory                    No productive sputum                    2017                

 

 Respiratory                    No chest congestion                    2017                

 

 Respiratory                    No chest tightness                    2017                

 

 Respiratory                    No cough                    2017         
       

 

 Respiratory                    No dyspnea                    2017       
         

 

 Gastrointestinal                    No abdominal pain                    2017                

 

 Gastrointestinal                    dyspepsia                    2017   
             

 

 Gastrointestinal                    gastroesophageal reflux                    
2017                

 

 Musculoskeletal                    No joint complaint                    2017                

 

 Neurologic                    No alteration of consciousness                  
  2017                

 

 Psychiatric                    No anxiety                    2017       
         

 

 Psychiatric                    No depression                    2017    
            

 

 Endocrine                    sweating                    2017           
     

 

 Constitutional                    recent illness                    2017
                

 

 Constitutional                    No anorexia                    2017   
             

 

 Constitutional                    No night sweats                    2017                

 

 Constitutional                    No chills                    2017     
           

 

 Constitutional                    No diaphoresis                    2017
                

 

 Constitutional                    No fatigue                    2017    
            

 

 Constitutional                    No fever                    2017      
          

 

 Constitutional                    No insomnia                    2017   
             

 

 Constitutional                    No malaise                    2017    
            

 

 Constitutional                    No weight loss                    2017
                

 

 Constitutional                    No weight gain                    2017
                

 

 Eyes                    No eye discharge                    2017        
        

 

 Eyes                    No eye erythema                    2017         
       

 

 Ears/Nose/Throat/Neck                    No dizziness                    2017                

 

 Ears/Nose/Throat/Neck                    No headache                    2017                

 

 Cardiovascular                    No chest pain/pressure                    2017                

 

 Cardiovascular                    No dyspnea                    2017    
            

 

 Respiratory                    No cough                    2017         
       

 

 Gastrointestinal                    abdominal pain                    2017                

 

 Gastrointestinal                    No constipation                    2017                

 

 Gastrointestinal                    diarrhea                    2017    
            

 

 Gastrointestinal                    gas and bloating                    2017                

 

 Gastrointestinal                    No vomiting                    2017 
               

 

 Gastrointestinal                    No nausea                    2017   
             

 

 Genitourinary/Nephrology                    No dysuria                                    

 

 Genitourinary/Nephrology                    pelvic pain                    2017                

 

 Genitourinary/Nephrology                    vaginal discharge                 
   2017                

 

 Musculoskeletal                    No joint complaint                    2017                

 

 Dermatologic                    No rash                    2017         
       

 

 Neurologic                    No alteration of consciousness                  
  2017                

 

 Constitutional                    No recent illness                    04/10/
2017                

 

 Constitutional                    No fatigue                    04/10/2017    
            

 

 Constitutional                    No fever                    04/10/2017      
          

 

 Constitutional                    No insomnia                    04/10/2017   
             

 

 Eyes                    No eye discharge                    04/10/2017        
        

 

 Eyes                    No eye erythema                    04/10/2017         
       

 

 Ears/Nose/Throat/Neck                    No headache                    04/10/
2017                

 

 Cardiovascular                    No chest pain/pressure                    04/
10/2017                

 

 Cardiovascular                    No edema                    04/10/2017      
          

 

 Cardiovascular                    hypertension                    04/10/2017  
              

 

 Cardiovascular                    No near-syncope/dizziness                    
04/10/2017                

 

 Cardiovascular                    No syncope                    04/10/2017    
            

 

 Respiratory                    No productive sputum                    04/10/
2017                

 

 Respiratory                    No chest congestion                    04/10/
2017                

 

 Respiratory                    No chest tightness                    04/10/
2017                

 

 Respiratory                    No cough                    04/10/2017         
       

 

 Respiratory                    No dyspnea                    04/10/2017       
         

 

 Gastrointestinal                    No abdominal pain                    04/10/
2017                

 

 Gastrointestinal                    No dyspepsia                    04/10/2017
                

 

 Gastrointestinal                    gastroesophageal reflux                    
04/10/2017                

 

 Musculoskeletal                    joint complaint                    04/10/
2017                

 

 Neurologic                    No alteration of consciousness                  
  04/10/2017                

 

 Psychiatric                    No anxiety                    04/10/2017       
         

 

 Psychiatric                    No depression                    04/10/2017    
            

 

 Endocrine                    sweating                    04/10/2017           
     

 

 Musculoskeletal                    arthralgia(s)                    04/10/2017
                

 

 Musculoskeletal                    stiffness                    04/10/2017    
            

 

 Constitutional                    No recent illness                    2017                

 

 Constitutional                    No chills                    2017     
           

 

 Constitutional                    No diaphoresis                    2017
                

 

 Constitutional                    No fever                    2017      
          

 

 Eyes                    No eye erythema                    2017         
       

 

 Ears/Nose/Throat/Neck                    No nasal allergies                    
2017                

 

 Ears/Nose/Throat/Neck                    No nasal discharge                    
2017                

 

 Cardiovascular                    No chest pain/pressure                                    

 

 Cardiovascular                    No dyspnea                    2017    
            

 

 Respiratory                    No cough                    2017         
       

 

 Respiratory                    No dyspnea                    2017       
         

 

 Gastrointestinal                    No abdominal pain                    2017                

 

 Musculoskeletal                    No joint complaint                    2017                

 

 Dermatologic                    No rash                    2017         
       

 

 Neurologic                    No alteration of consciousness                  
  2017                

 

 Neurologic                    No mental status change                    2017                

 

 Constitutional                    No recent illness                    2017                

 

 Constitutional                    No fatigue                    2017    
            

 

 Constitutional                    No fever                    2017      
          

 

 Constitutional                    No insomnia                    2017   
             

 

 Eyes                    No eye discharge                    2017        
        

 

 Eyes                    No eye erythema                    2017         
       

 

 Ears/Nose/Throat/Neck                    No headache                    2017                

 

 Cardiovascular                    No chest pain/pressure                                    

 

 Cardiovascular                    No edema                    2017      
          

 

 Cardiovascular                    hypertension                    2017  
              

 

 Cardiovascular                    No near-syncope/dizziness                    
2017                

 

 Cardiovascular                    No syncope                    2017    
            

 

 Respiratory                    No productive sputum                    2017                

 

 Respiratory                    No chest congestion                    2017                

 

 Respiratory                    No chest tightness                    2017                

 

 Respiratory                    No cough                    2017         
       

 

 Respiratory                    No dyspnea                    2017       
         

 

 Gastrointestinal                    No abdominal pain                    2017                

 

 Gastrointestinal                    No dyspepsia                    2017
                

 

 Gastrointestinal                    gastroesophageal reflux                    
2017                

 

 Musculoskeletal                    No joint complaint                    2017                

 

 Neurologic                    No alteration of consciousness                  
  2017                

 

 Psychiatric                    No anxiety                    2017       
         

 

 Psychiatric                    No depression                    2017    
            

 

 Endocrine                    sweating                    2017           
     

 

 Constitutional                    No recent illness                    10/03/
2016                

 

 Constitutional                    No fatigue                    10/03/2016    
            

 

 Constitutional                    No fever                    10/03/2016      
          

 

 Constitutional                    No insomnia                    10/03/2016   
             

 

 Eyes                    No eye discharge                    10/03/2016        
        

 

 Eyes                    No eye erythema                    10/03/2016         
       

 

 Ears/Nose/Throat/Neck                    No headache                    10/03/
2016                

 

 Cardiovascular                    No chest pain/pressure                    10/
2016                

 

 Cardiovascular                    No edema                    10/03/2016      
          

 

 Cardiovascular                    hypertension                    10/03/2016  
              

 

 Cardiovascular                    No near-syncope/dizziness                    
10/03/2016                

 

 Cardiovascular                    No syncope                    10/03/2016    
            

 

 Respiratory                    No productive sputum                    10/03/
2016                

 

 Respiratory                    No chest congestion                    10/03/
2016                

 

 Respiratory                    No chest tightness                    10/03/
2016                

 

 Respiratory                    No cough                    10/03/2016         
       

 

 Respiratory                    No dyspnea                    10/03/2016       
         

 

 Gastrointestinal                    No abdominal pain                    10/03/
2016                

 

 Gastrointestinal                    gastroesophageal reflux                    
10/03/2016                

 

 Musculoskeletal                    No joint complaint                    10/03/
2016                

 

 Neurologic                    No alteration of consciousness                  
  10/03/2016                

 

 Psychiatric                    No anxiety                    10/03/2016       
         

 

 Psychiatric                    No depression                    10/03/2016    
            

 

 Endocrine                    sweating                    10/03/2016           
     

 

 Gastrointestinal                    No dyspepsia                    10/03/2016
                

 

 Constitutional                    No recent illness                    2016                

 

 Constitutional                    No fatigue                    2016    
            

 

 Constitutional                    No fever                    2016      
          

 

 Constitutional                    No insomnia                    2016   
             

 

 Eyes                    No eye discharge                    2016        
        

 

 Eyes                    No eye erythema                    2016         
       

 

 Ears/Nose/Throat/Neck                    No headache                    2016                

 

 Cardiovascular                    No chest pain/pressure                                    

 

 Cardiovascular                    No edema                    2016      
          

 

 Cardiovascular                    hypertension                    2016  
              

 

 Cardiovascular                    No near-syncope/dizziness                    
2016                

 

 Cardiovascular                    No syncope                    2016    
            

 

 Respiratory                    No productive sputum                    2016                

 

 Respiratory                    No chest congestion                    2016                

 

 Respiratory                    No chest tightness                    2016                

 

 Respiratory                    No cough                    2016         
       

 

 Respiratory                    No dyspnea                    2016       
         

 

 Gastrointestinal                    No abdominal pain                    2016                

 

 Gastrointestinal                    No dyspepsia                    2016
                

 

 Gastrointestinal                    gastroesophageal reflux                    
2016                

 

 Musculoskeletal                    No joint complaint                    2016                

 

 Neurologic                    No alteration of consciousness                  
  2016                

 

 Psychiatric                    No anxiety                    2016       
         

 

 Psychiatric                    No depression                    2016    
            

 

 Endocrine                    sweating                    2016           
     

 

 Constitutional                    No recent illness                    2016                

 

 Constitutional                    No fatigue                    2016    
            

 

 Constitutional                    No fever                    2016      
          

 

 Constitutional                    No insomnia                    2016   
             

 

 Eyes                    No eye discharge                    2016        
        

 

 Eyes                    No eye erythema                    2016         
       

 

 Ears/Nose/Throat/Neck                    No headache                    2016                

 

 Cardiovascular                    No chest pain/pressure                    2016                

 

 Cardiovascular                    No edema                    2016      
          

 

 Cardiovascular                    hypertension                    2016  
              

 

 Cardiovascular                    No near-syncope/dizziness                    
2016                

 

 Cardiovascular                    No syncope                    2016    
            

 

 Respiratory                    No productive sputum                    2016                

 

 Respiratory                    No chest congestion                    2016                

 

 Respiratory                    No chest tightness                    2016                

 

 Respiratory                    No cough                    2016         
       

 

 Respiratory                    No dyspnea                    2016       
         

 

 Gastrointestinal                    No abdominal pain                    2016                

 

 Gastrointestinal                    No constipation                    2016                

 

 Gastrointestinal                    diarrhea                    2016    
            

 

 Gastrointestinal                    dyspepsia                    2016   
             

 

 Gastrointestinal                    gastroesophageal reflux                    
2016                

 

 Genitourinary/Nephrology                    No breast complaint               
     2016                

 

 Genitourinary/Nephrology                    No dysuria                                    

 

 Genitourinary/Nephrology                    No hematuria                    2016                

 

 Genitourinary/Nephrology                    No menopausal symptoms            
        2016                

 

 Genitourinary/Nephrology                    No nocturia                    2016                

 

 Genitourinary/Nephrology                    No Pap smear abnormality          
          2016                

 

 Genitourinary/Nephrology                    No urinary urgency                
    2016                

 

 Genitourinary/Nephrology                    No urinary frequency              
      2016                

 

 Genitourinary/Nephrology                    No urinary incontinence           
         2016                

 

 Genitourinary/Nephrology                    No vaginal discharge              
      2016                

 

 Musculoskeletal                    No joint complaint                    2016                

 

 Dermatologic                    pigmentation change                    2016                

 

 Dermatologic                    rash                    2016            
    

 

 Neurologic                    No alteration of consciousness                  
  2016                

 

 Psychiatric                    No anxiety                    2016       
         

 

 Psychiatric                    No depression                    2016    
            

 

 Endocrine                    sweating                    2016           
     

 

 Constitutional                    No recent illness                    2016                

 

 Constitutional                    No fatigue                    2016    
            

 

 Constitutional                    No fever                    2016      
          

 

 Constitutional                    No insomnia                    2016   
             

 

 Eyes                    No eye discharge                    2016        
        

 

 Eyes                    No eye erythema                    2016         
       

 

 Ears/Nose/Throat/Neck                    No headache                    2016                

 

 Cardiovascular                    No chest pain/pressure                    2016                

 

 Cardiovascular                    No edema                    2016      
          

 

 Cardiovascular                    hypertension                    2016  
              

 

 Cardiovascular                    No near-syncope/dizziness                    
2016                

 

 Cardiovascular                    No syncope                    2016    
            

 

 Respiratory                    No productive sputum                    2016                

 

 Respiratory                    No chest congestion                    2016                

 

 Respiratory                    No chest tightness                    2016                

 

 Respiratory                    No cough                    2016         
       

 

 Respiratory                    No dyspnea                    2016       
         

 

 Gastrointestinal                    No abdominal pain                    2016                

 

 Gastrointestinal                    No constipation                    2016                

 

 Gastrointestinal                    No diarrhea                    2016 
               

 

 Gastrointestinal                    dyspepsia                    2016   
             

 

 Gastrointestinal                    gastroesophageal reflux                    
2016                

 

 Genitourinary/Nephrology                    No breast complaint               
     2016                

 

 Genitourinary/Nephrology                    No dysuria                                    

 

 Genitourinary/Nephrology                    No hematuria                    2016                

 

 Genitourinary/Nephrology                    No menopausal symptoms            
        2016                

 

 Genitourinary/Nephrology                    No nocturia                    2016                

 

 Genitourinary/Nephrology                    No Pap smear abnormality          
          2016                

 

 Genitourinary/Nephrology                    No urinary urgency                
    2016                

 

 Genitourinary/Nephrology                    No urinary frequency              
      2016                

 

 Genitourinary/Nephrology                    No urinary incontinence           
         2016                

 

 Genitourinary/Nephrology                    No vaginal discharge              
      2016                

 

 Musculoskeletal                    No joint complaint                    2016                

 

 Dermatologic                    pigmentation change                    2016                

 

 Dermatologic                    rash                    2016            
    

 

 Neurologic                    No alteration of consciousness                  
  2016                

 

 Psychiatric                    No anxiety                    2016       
         

 

 Psychiatric                    No depression                    2016    
            

 

 Endocrine                    sweating                    2016           
     

 

 Constitutional                    No recent illness                    2015                

 

 Constitutional                    No fatigue                    2015    
            

 

 Constitutional                    No fever                    2015      
          

 

 Constitutional                    No insomnia                    2015   
             

 

 Eyes                    No eye discharge                    2015        
        

 

 Eyes                    No eye erythema                    2015         
       

 

 Ears/Nose/Throat/Neck                    No headache                    2015                

 

 Cardiovascular                    No chest pain/pressure                    2015                

 

 Cardiovascular                    No edema                    2015      
          

 

 Cardiovascular                    hypertension                    2015  
              

 

 Cardiovascular                    No near-syncope/dizziness                    
2015                

 

 Cardiovascular                    No syncope                    2015    
            

 

 Respiratory                    No productive sputum                    2015                

 

 Respiratory                    No chest congestion                    2015                

 

 Respiratory                    No chest tightness                    2015                

 

 Respiratory                    No cough                    2015         
       

 

 Respiratory                    No dyspnea                    2015       
         

 

 Gastrointestinal                    No abdominal pain                    2015                

 

 Gastrointestinal                    No constipation                    2015                

 

 Gastrointestinal                    No diarrhea                    2015 
               

 

 Gastrointestinal                    dyspepsia                    2015   
             

 

 Gastrointestinal                    gastroesophageal reflux                    
2015                

 

 Genitourinary/Nephrology                    No breast complaint               
     2015                

 

 Genitourinary/Nephrology                    No dysuria                                    

 

 Genitourinary/Nephrology                    No hematuria                    2015                

 

 Genitourinary/Nephrology                    No menopausal symptoms            
        2015                

 

 Genitourinary/Nephrology                    No nocturia                    2015                

 

 Genitourinary/Nephrology                    No Pap smear abnormality          
          2015                

 

 Genitourinary/Nephrology                    No urinary urgency                
    2015                

 

 Genitourinary/Nephrology                    No urinary frequency              
      2015                

 

 Genitourinary/Nephrology                    No urinary incontinence           
         2015                

 

 Genitourinary/Nephrology                    No vaginal discharge              
      2015                

 

 Musculoskeletal                    No joint complaint                    2015                

 

 Dermatologic                    pigmentation change                    2015                

 

 Dermatologic                    rash                    2015            
    

 

 Neurologic                    No alteration of consciousness                  
  2015                

 

 Psychiatric                    No anxiety                    2015       
         

 

 Psychiatric                    No depression                    2015    
            

 

 Endocrine                    sweating                    2015           
     

 

 Constitutional                    No recent illness                    2015                

 

 Constitutional                    No anorexia                    2015   
             

 

 Constitutional                    No night sweats                    2015                

 

 Constitutional                    No chills                    2015     
           

 

 Constitutional                    No diaphoresis                    2015
                

 

 Constitutional                    No fatigue                    2015    
            

 

 Constitutional                    No fever                    2015      
          

 

 Constitutional                    No insomnia                    2015   
             

 

 Constitutional                    No malaise                    2015    
            

 

 Constitutional                    No weight loss                    2015
                

 

 Constitutional                    No weight gain                    2015
                

 

 Eyes                    No eye discharge                    2015        
        

 

 Eyes                    No eye erythema                    2015         
       

 

 Ears/Nose/Throat/Neck                    No headache                    2015                

 

 Respiratory                    No cough                    2015         
       

 

 Gastrointestinal                    No constipation                    2015                

 

 Gastrointestinal                    No diarrhea                    2015 
               

 

 Musculoskeletal                    back pain                    2015    
            

 

 Dermatologic                    No rash                    2015         
       

 

 Dermatologic                    No sores                    2015        
        

 

 Respiratory                    No dyspnea                    2015       
         

 

 Respiratory                    No dyspnea on exertion                    2015                

 

 Constitutional                    No recent illness                    2015                

 

 Constitutional                    No fatigue                    2015    
            

 

 Constitutional                    No fever                    2015      
          

 

 Constitutional                    No insomnia                    2015   
             

 

 Eyes                    No eye discharge                    2015        
        

 

 Eyes                    No eye erythema                    2015         
       

 

 Ears/Nose/Throat/Neck                    No headache                    2015                

 

 Cardiovascular                    No chest pain/pressure                    2015                

 

 Cardiovascular                    No edema                    2015      
          

 

 Cardiovascular                    hypertension                    2015  
              

 

 Cardiovascular                    No near-syncope/dizziness                    
2015                

 

 Cardiovascular                    No syncope                    2015    
            

 

 Respiratory                    No productive sputum                    2015                

 

 Respiratory                    No chest congestion                    2015                

 

 Respiratory                    No chest tightness                    2015                

 

 Respiratory                    No cough                    2015         
       

 

 Respiratory                    No dyspnea                    2015       
         

 

 Gastrointestinal                    No abdominal pain                    2015                

 

 Gastrointestinal                    No constipation                    2015                

 

 Gastrointestinal                    No diarrhea                    2015 
               

 

 Gastrointestinal                    dyspepsia                    2015   
             

 

 Gastrointestinal                    gastroesophageal reflux                    
2015                

 

 Genitourinary/Nephrology                    No breast complaint               
     2015                

 

 Genitourinary/Nephrology                    No dysuria                                    

 

 Genitourinary/Nephrology                    No hematuria                    2015                

 

 Genitourinary/Nephrology                    No menopausal symptoms            
        2015                

 

 Genitourinary/Nephrology                    No nocturia                    2015                

 

 Genitourinary/Nephrology                    No Pap smear abnormality          
          2015                

 

 Genitourinary/Nephrology                    No urinary urgency                
    2015                

 

 Genitourinary/Nephrology                    No urinary frequency              
      2015                

 

 Genitourinary/Nephrology                    No urinary incontinence           
         2015                

 

 Genitourinary/Nephrology                    No vaginal discharge              
      2015                

 

 Musculoskeletal                    No joint complaint                    2015                

 

 Dermatologic                    pigmentation change                    2015                

 

 Dermatologic                    rash                    2015            
    

 

 Neurologic                    No alteration of consciousness                  
  2015                

 

 Psychiatric                    No anxiety                    2015       
         

 

 Psychiatric                    No depression                    2015    
            

 

 Constitutional                    No fatigue                    2015    
            

 

 Constitutional                    No fever                    2015      
          

 

 Constitutional                    No insomnia                    2015   
             

 

 Constitutional                    No recent illness                    2015                

 

 Eyes                    No eye discharge                    2015        
        

 

 Eyes                    No eye erythema                    2015         
       

 

 Ears/Nose/Throat/Neck                    No headache                    2015                

 

 Cardiovascular                    No chest pain/pressure                                    

 

 Cardiovascular                    No edema                    2015      
          

 

 Cardiovascular                    No near-syncope/dizziness                    
2015                

 

 Cardiovascular                    No syncope                    2015    
            

 

 Respiratory                    No chest congestion                    2015                

 

 Respiratory                    No chest tightness                    2015                

 

 Respiratory                    No cough                    2015         
       

 

 Respiratory                    No dyspnea                    2015       
         

 

 Respiratory                    No productive sputum                    2015                

 

 Gastrointestinal                    No abdominal pain                    2015                

 

 Gastrointestinal                    No constipation                    2015                

 

 Gastrointestinal                    No diarrhea                    2015 
               

 

 Genitourinary/Nephrology                    No breast complaint               
     2015                

 

 Genitourinary/Nephrology                    No dysuria                                    

 

 Genitourinary/Nephrology                    No hematuria                                    

 

 Genitourinary/Nephrology                    No urinary frequency              
      2015                

 

 Genitourinary/Nephrology                    No urinary incontinence           
         2015                

 

 Genitourinary/Nephrology                    No urinary urgency                
    2015                

 

 Genitourinary/Nephrology                    No vaginal discharge              
      2015                

 

 Genitourinary/Nephrology                    No menopausal symptoms            
        2015                

 

 Genitourinary/Nephrology                    No nocturia                                    

 

 Genitourinary/Nephrology                    No Pap smear abnormality          
          2015                

 

 Musculoskeletal                    No joint complaint                    2015                

 

 Neurologic                    No alteration of consciousness                  
  2015                

 

 Dermatologic                    rash                    2015            
    

 

 Dermatologic                    pigmentation change                    2015                

 

 Psychiatric                    No anxiety                    2015       
         

 

 Psychiatric                    No depression                    2015    
            

 

 Cardiovascular                    hypertension                    2015  
              

 

 Gastrointestinal                    dyspepsia                    2015   
             

 

 Gastrointestinal                    gastroesophageal reflux                    
2015                



                                                                    



Physical Exam

                      





 Exam Name                    System Name                    Item Name         
           Status                    Result                    Effective Dates 
                   Notes                

 

 Full Exam - General                     Constitutional                     
general appearance                    Development:                    well 
developed                    2018                    None                

 

 Full Exam - General                     Constitutional                     
general appearance                    Development:                    appears 
stated age                    2018                    None                

 

 Full Exam - General                     Constitutional                     
general appearance                    Hygiene/Attention to Grooming:           
         good hygiene                    2018                    None    
            

 

 Full Exam - General                     Eyes                    conjunctiva
/eyelids                    Overall:                    conjunctiva clear      
              2018                    None                

 

 Full Exam - General                     Eyes                    conjunctiva
/eyelids                    Overall:                    cornea clear           
         2018                    None                

 

 Full Exam - General                     Eyes                    conjunctiva
/eyelids                    Overall:                    eyelids normal         
           2018                    None                

 

 Full Exam - General                     Eyes                    pupils and 
irises                    Overall:                    pupils equal, round, 
reactive to light and accomodation                    2018               
     None                

 

 Full Exam - General                     Ears/Nose/Throat                  
  otoscopic exam                    Overall:                    external 
auditory canals clear                    2018                    None    
            

 

 Full Exam - General                     Ears/Nose/Throat                  
  otoscopic exam                    Overall:                    tympanic 
membranes clear                    2018                    None          
      

 

 Full Exam - General                     Ears/Nose/Throat                  
  lips/teeth/gingiva                    Overall:                    benign lips
                    2018                    None                

 

 Full Exam - General                     Ears/Nose/Throat                  
  lips/teeth/gingiva                    Overall:                    normal 
dentition                    2018                    None                

 

 Full Exam - General                     Ears/Nose/Throat                  
  oral cavity/pharynx/larynx                     Overall:                    
oral mucosa clear                    2018                    None        
        

 

 Full Exam - General                     Ears/Nose/Throat                  
  oral cavity/pharynx/larynx                     Overall:                    
oropharyngeal mucosa clear                    2018                    
None                

 

 Full Exam - General                     Ears/Nose/Throat                  
  oral cavity/pharynx/larynx                     Overall:                    
hypopharynx benign                    2018                    None       
         

 

 Full Exam - General                     Ears/Nose/Throat                  
  oral cavity/pharynx/larynx                     Overall:                    no 
masses                    2018                    None                

 

 Full Exam - General                     Respiratory                    
auscultation                    Overall:                    breath sounds clear 
bilaterally                    2018                    None              
  

 

 Full Exam - General                     Respiratory                    
respiratory effort/rhythm                    Overall:                    no 
retractions                    2018                    None              
  

 

 Full Exam - General                     Respiratory                    
respiratory effort/rhythm                    Overall:                    normal 
rate                    2018                    None                

 

 Full Exam - General                     Cardiovascular                    
extremities                    Overall:                    no clubbing         
           2018                    None                

 

 Full Exam - General                     Cardiovascular                    
auscultation of heart                    Overall:                    regular 
rate                    2018                    None                

 

 Full Exam - General                     Cardiovascular                    
auscultation of heart                    Overall:                    normal 
heart sounds                    2018                    None             
   

 

 Full Exam - General                     Abdomen                    
abdominal exam                    Overall:                    no tenderness    
                2018                    None                

 

 Full Exam - General                     Abdomen                    
abdominal exam                    Overall:                    normal bowel 
sounds                    2018                    None                

 

 Full Exam - General                     Musculoskeletal                    
spine, ribs and pelvis                    Overall:                    good 
posture                    2018                    None                

 

 Full Exam - General                     Musculoskeletal                    
head and neck                    Overall:                    head atraumatic   
                 2018                    None                

 

 Full Exam - General                     Musculoskeletal                    
head and neck                    Overall:                    cervical spine 
benign                    2018                    None                

 

 Full Exam - General                     Neurologic                    deep 
tendon reflexes                    Overall:                    deep tendon 
reflexes intact                    2018                    None          
      

 

 Full Exam - General                     Neurologic                    
cranial nerves                    Overall:                    crainial nerves 2 
- 12 grossly intact                    2018                    None      
          

 

 Full Exam - General                     Psychiatric                    
orientation/consciousness                    Overall:                    
oriented to person, place and time                    2018               
     None                

 

 Full Exam - General                     Psychiatric                    
mood and affect                    Overall:                    normal mood and 
affect                    2018                    None                

 

 Full Exam - General                     Constitutional                     
general appearance                    Development:                    well 
developed                    10/16/2017                    None                

 

 Full Exam - General                     Constitutional                     
general appearance                    Development:                    appears 
stated age                    10/16/2017                    None                

 

 Full Exam - General                     Constitutional                     
general appearance                    Hygiene/Attention to Grooming:           
         good hygiene                    10/16/2017                    None    
            

 

 Full Exam - General                     Eyes                    conjunctiva
/eyelids                    Overall:                    conjunctiva clear      
              10/16/2017                    None                

 

 Full Exam - General                     Eyes                    conjunctiva
/eyelids                    Overall:                    cornea clear           
         10/16/2017                    None                

 

 Full Exam - General                     Eyes                    conjunctiva
/eyelids                    Overall:                    eyelids normal         
           10/16/2017                    None                

 

 Full Exam - General                     Eyes                    pupils and 
irises                    Overall:                    pupils equal, round, 
reactive to light and accomodation                    10/16/2017               
     None                

 

 Full Exam - General                     Ears/Nose/Throat                  
  otoscopic exam                    Overall:                    external 
auditory canals clear                    10/16/2017                    None    
            

 

 Full Exam - General                     Ears/Nose/Throat                  
  otoscopic exam                    Overall:                    tympanic 
membranes clear                    10/16/2017                    None          
      

 

 Full Exam - General                     Ears/Nose/Throat                  
  lips/teeth/gingiva                    Overall:                    benign lips
                    10/16/2017                    None                

 

 Full Exam - General                     Ears/Nose/Throat                  
  lips/teeth/gingiva                    Overall:                    normal 
dentition                    10/16/2017                    None                

 

 Full Exam - General                     Ears/Nose/Throat                  
  oral cavity/pharynx/larynx                     Overall:                    
oral mucosa clear                    10/16/2017                    None        
        

 

 Full Exam - General                     Ears/Nose/Throat                  
  oral cavity/pharynx/larynx                     Overall:                    
oropharyngeal mucosa clear                    10/16/2017                    
None                

 

 Full Exam - General                     Ears/Nose/Throat                  
  oral cavity/pharynx/larynx                     Overall:                    
hypopharynx benign                    10/16/2017                    None       
         

 

 Full Exam - General                     Ears/Nose/Throat                  
  oral cavity/pharynx/larynx                     Overall:                    no 
masses                    10/16/2017                    None                

 

 Full Exam - General                     Respiratory                    
auscultation                    Overall:                    breath sounds clear 
bilaterally                    10/16/2017                    None              
  

 

 Full Exam - General                     Respiratory                    
respiratory effort/rhythm                    Overall:                    no 
retractions                    10/16/2017                    None              
  

 

 Full Exam - General                     Respiratory                    
respiratory effort/rhythm                    Overall:                    normal 
rate                    10/16/2017                    None                

 

 Full Exam - General                     Cardiovascular                    
extremities                    Overall:                    no clubbing         
           10/16/2017                    None                

 

 Full Exam - General                     Cardiovascular                    
auscultation of heart                    Overall:                    regular 
rate                    10/16/2017                    None                

 

 Full Exam - General                     Cardiovascular                    
auscultation of heart                    Overall:                    normal 
heart sounds                    10/16/2017                    None             
   

 

 Full Exam - General                     Abdomen                    
abdominal exam                    Overall:                    no tenderness    
                10/16/2017                    None                

 

 Full Exam - General                     Abdomen                    
abdominal exam                    Overall:                    normal bowel 
sounds                    10/16/2017                    None                

 

 Full Exam - General                     Musculoskeletal                    
spine, ribs and pelvis                    Overall:                    good 
posture                    10/16/2017                    None                

 

 Full Exam - General                     Musculoskeletal                    
head and neck                    Overall:                    head atraumatic   
                 10/16/2017                    None                

 

 Full Exam - General                     Musculoskeletal                    
head and neck                    Overall:                    cervical spine 
benign                    10/16/2017                    None                

 

 Full Exam - General                     Neurologic                    deep 
tendon reflexes                    Overall:                    deep tendon 
reflexes intact                    10/16/2017                    None          
      

 

 Full Exam - General                     Neurologic                    
cranial nerves                    Overall:                    crainial nerves 2 
- 12 grossly intact                    10/16/2017                    None      
          

 

 Full Exam - General                     Psychiatric                    
orientation/consciousness                    Overall:                    
oriented to person, place and time                    10/16/2017               
     None                

 

 Full Exam - General                     Psychiatric                    
mood and affect                    Overall:                    normal mood and 
affect                    10/16/2017                    None                

 

 Full Exam - General                     Constitutional                     
general appearance                    Development:                    well 
developed                    2017                    None                

 

 Full Exam - General                     Constitutional                     
general appearance                    Development:                    appears 
stated age                    2017                    None                

 

 Full Exam - General                     Constitutional                     
general appearance                    Hygiene/Attention to Grooming:           
         good hygiene                    2017                    None    
            

 

 Full Exam - General                     Eyes                    conjunctiva
/eyelids                    Overall:                    conjunctiva clear      
              2017                    None                

 

 Full Exam - General                     Eyes                    conjunctiva
/eyelids                    Overall:                    cornea clear           
         2017                    None                

 

 Full Exam - General                     Eyes                    conjunctiva
/eyelids                    Overall:                    eyelids normal         
           2017                    None                

 

 Full Exam - General                     Eyes                    pupils and 
irises                    Overall:                    pupils equal, round, 
reactive to light and accomodation                    2017               
     None                

 

 Full Exam - General                     Ears/Nose/Throat                  
  otoscopic exam                    Overall:                    external 
auditory canals clear                    2017                    None    
            

 

 Full Exam - General                     Ears/Nose/Throat                  
  otoscopic exam                    Overall:                    tympanic 
membranes clear                    2017                    None          
      

 

 Full Exam - General                     Ears/Nose/Throat                  
  lips/teeth/gingiva                    Overall:                    benign lips
                    2017                    None                

 

 Full Exam - General                     Ears/Nose/Throat                  
  lips/teeth/gingiva                    Overall:                    normal 
dentition                    2017                    None                

 

 Full Exam - General                     Ears/Nose/Throat                  
  oral cavity/pharynx/larynx                     Overall:                    
oral mucosa clear                    2017                    None        
        

 

 Full Exam - General                     Ears/Nose/Throat                  
  oral cavity/pharynx/larynx                     Overall:                    
oropharyngeal mucosa clear                    2017                    
None                

 

 Full Exam - General                     Ears/Nose/Throat                  
  oral cavity/pharynx/larynx                     Overall:                    
hypopharynx benign                    2017                    None       
         

 

 Full Exam - General                     Ears/Nose/Throat                  
  oral cavity/pharynx/larynx                     Overall:                    no 
masses                    2017                    None                

 

 Full Exam - General                     Respiratory                    
auscultation                    Overall:                    breath sounds clear 
bilaterally                    2017                    None              
  

 

 Full Exam - General                     Respiratory                    
respiratory effort/rhythm                    Overall:                    no 
retractions                    2017                    None              
  

 

 Full Exam - General                     Respiratory                    
respiratory effort/rhythm                    Overall:                    normal 
rate                    2017                    None                

 

 Full Exam - General                     Cardiovascular                    
extremities                    Overall:                    no clubbing         
           2017                    None                

 

 Full Exam - General                     Cardiovascular                    
auscultation of heart                    Overall:                    regular 
rate                    2017                    None                

 

 Full Exam - General                     Cardiovascular                    
auscultation of heart                    Overall:                    normal 
heart sounds                    2017                    None             
   

 

 Full Exam - General                     Abdomen                    
abdominal exam                    Overall:                    no tenderness    
                2017                    None                

 

 Full Exam - General                     Abdomen                    
abdominal exam                    Overall:                    normal bowel 
sounds                    2017                    None                

 

 Full Exam - General                     Musculoskeletal                    
spine, ribs and pelvis                    Overall:                    good 
posture                    2017                    None                

 

 Full Exam - General                     Musculoskeletal                    
head and neck                    Overall:                    head atraumatic   
                 2017                    None                

 

 Full Exam - General                     Musculoskeletal                    
head and neck                    Overall:                    cervical spine 
benign                    2017                    None                

 

 Full Exam - General                     Neurologic                    deep 
tendon reflexes                    Overall:                    deep tendon 
reflexes intact                    2017                    None          
      

 

 Full Exam - General                     Neurologic                    
cranial nerves                    Overall:                    crainial nerves 2 
- 12 grossly intact                    2017                    None      
          

 

 Full Exam - General                     Psychiatric                    
orientation/consciousness                    Overall:                    
oriented to person, place and time                    2017               
     None                

 

 Full Exam - General                     Psychiatric                    
mood and affect                    Overall:                    normal mood and 
affect                    2017                    None                

 

 Full Exam - General                     Constitutional                     
general appearance                    Development:                    well 
developed                    2017                    None                

 

 Full Exam - General                     Constitutional                     
general appearance                    Development:                    appears 
stated age                    2017                    None                

 

 Full Exam - General                     Constitutional                     
general appearance                    Hygiene/Attention to Grooming:           
         good hygiene                    2017                    None    
            

 

 Full Exam - General                     Eyes                    conjunctiva
/eyelids                    Overall:                    conjunctiva clear      
              2017                    None                

 

 Full Exam - General                     Eyes                    conjunctiva
/eyelids                    Overall:                    cornea clear           
         2017                    None                

 

 Full Exam - General                     Eyes                    conjunctiva
/eyelids                    Overall:                    eyelids normal         
           2017                    None                

 

 Full Exam - General                     Eyes                    pupils and 
irises                    Overall:                    pupils equal, round, 
reactive to light and accomodation                    2017               
     None                

 

 Full Exam - General                     Ears/Nose/Throat                  
  otoscopic exam                    Overall:                    external 
auditory canals clear                    2017                    None    
            

 

 Full Exam - General                     Ears/Nose/Throat                  
  otoscopic exam                    Overall:                    tympanic 
membranes clear                    2017                    None          
      

 

 Full Exam - General                     Ears/Nose/Throat                  
  lips/teeth/gingiva                    Overall:                    benign lips
                    2017                    None                

 

 Full Exam - General                     Ears/Nose/Throat                  
  lips/teeth/gingiva                    Overall:                    normal 
dentition                    2017                    None                

 

 Full Exam - General                     Ears/Nose/Throat                  
  oral cavity/pharynx/larynx                     Overall:                    
oral mucosa clear                    2017                    None        
        

 

 Full Exam - General                     Ears/Nose/Throat                  
  oral cavity/pharynx/larynx                     Overall:                    
oropharyngeal mucosa clear                    2017                    
None                

 

 Full Exam - General                     Ears/Nose/Throat                  
  oral cavity/pharynx/larynx                     Overall:                    
hypopharynx benign                    2017                    None       
         

 

 Full Exam - General                     Ears/Nose/Throat                  
  oral cavity/pharynx/larynx                     Overall:                    no 
masses                    2017                    None                

 

 Full Exam - General                     Respiratory                    
auscultation                    Overall:                    breath sounds clear 
bilaterally                    2017                    None              
  

 

 Full Exam - General                     Respiratory                    
respiratory effort/rhythm                    Overall:                    no 
retractions                    2017                    None              
  

 

 Full Exam - General                     Respiratory                    
respiratory effort/rhythm                    Overall:                    normal 
rate                    2017                    None                

 

 Full Exam - General                     Cardiovascular                    
extremities                    Overall:                    no clubbing         
           2017                    None                

 

 Full Exam - General                     Cardiovascular                    
auscultation of heart                    Overall:                    regular 
rate                    2017                    None                

 

 Full Exam - General                     Cardiovascular                    
auscultation of heart                    Overall:                    normal 
heart sounds                    2017                    None             
   

 

 Full Exam - General                     Abdomen                    
abdominal exam                    Overall:                    no tenderness    
                2017                    None                

 

 Full Exam - General                     Abdomen                    
abdominal exam                    Overall:                    normal bowel 
sounds                    2017                    None                

 

 Full Exam - General                     Musculoskeletal                    
spine, ribs and pelvis                    Overall:                    good 
posture                    2017                    None                

 

 Full Exam - General                     Musculoskeletal                    
head and neck                    Overall:                    head atraumatic   
                 2017                    None                

 

 Full Exam - General                     Musculoskeletal                    
head and neck                    Overall:                    cervical spine 
benign                    2017                    None                

 

 Full Exam - General                     Neurologic                    deep 
tendon reflexes                    Overall:                    deep tendon 
reflexes intact                    2017                    None          
      

 

 Full Exam - General                     Neurologic                    
cranial nerves                    Overall:                    crainial nerves 2 
- 12 grossly intact                    2017                    None      
          

 

 Full Exam - General                     Psychiatric                    
orientation/consciousness                    Overall:                    
oriented to person, place and time                    2017               
     None                

 

 Full Exam - General                     Psychiatric                    
mood and affect                    Overall:                    normal mood and 
affect                    2017                    None                

 

 Full Exam - General                     Genitourinary                    
uterus                    Overall:                    surgically absent        
            2017                    None                

 

 Full Exam - General                     Genitourinary                    
cervix                    Overall:                    surgically absent        
            2017                    None                

 

 Full Exam - General                     Genitourinary                    
labia and vagina                    Labia:                    no lesions 
present                    2017                    None                

 

 Full Exam - General                     Genitourinary                    
labia and vagina                    Vagina:                    erythematous    
                2017                    area of erythema and bleeding in 
vaginal canal at approx 6 o'clock                 

 

 Full Exam - General                     Genitourinary                    
adnexa/parametria                    Overall:                    surgically 
absent                    2017                    None                

 

 Full Exam - General                     Genitourinary                    
urethra                    Overall:                    no masses               
     2017                    None                

 

 Full Exam - General                     Constitutional                     
general appearance                    Development:                    well 
developed                    04/10/2017                    None                

 

 Full Exam - General                     Constitutional                     
general appearance                    Development:                    appears 
stated age                    04/10/2017                    None                

 

 Full Exam - General                     Constitutional                     
general appearance                    Hygiene/Attention to Grooming:           
         good hygiene                    04/10/2017                    None    
            

 

 Full Exam - General                     Eyes                    conjunctiva
/eyelids                    Overall:                    conjunctiva clear      
              04/10/2017                    None                

 

 Full Exam - General                     Eyes                    conjunctiva
/eyelids                    Overall:                    cornea clear           
         04/10/2017                    None                

 

 Full Exam - General                     Eyes                    conjunctiva
/eyelids                    Overall:                    eyelids normal         
           04/10/2017                    None                

 

 Full Exam - General                     Eyes                    pupils and 
irises                    Overall:                    pupils equal, round, 
reactive to light and accomodation                    04/10/2017               
     None                

 

 Full Exam - General                     Ears/Nose/Throat                  
  otoscopic exam                    Overall:                    external 
auditory canals clear                    04/10/2017                    None    
            

 

 Full Exam - General                     Ears/Nose/Throat                  
  otoscopic exam                    Overall:                    tympanic 
membranes clear                    04/10/2017                    None          
      

 

 Full Exam - General                     Ears/Nose/Throat                  
  lips/teeth/gingiva                    Overall:                    benign lips
                    04/10/2017                    None                

 

 Full Exam - General                     Ears/Nose/Throat                  
  lips/teeth/gingiva                    Overall:                    normal 
dentition                    04/10/2017                    None                

 

 Full Exam - General                     Ears/Nose/Throat                  
  oral cavity/pharynx/larynx                     Overall:                    
oral mucosa clear                    04/10/2017                    None        
        

 

 Full Exam - General                     Ears/Nose/Throat                  
  oral cavity/pharynx/larynx                     Overall:                    
oropharyngeal mucosa clear                    04/10/2017                    
None                

 

 Full Exam - General                     Ears/Nose/Throat                  
  oral cavity/pharynx/larynx                     Overall:                    
hypopharynx benign                    04/10/2017                    None       
         

 

 Full Exam - General                     Ears/Nose/Throat                  
  oral cavity/pharynx/larynx                     Overall:                    no 
masses                    04/10/2017                    None                

 

 Full Exam - General                     Respiratory                    
auscultation                    Overall:                    breath sounds clear 
bilaterally                    04/10/2017                    None              
  

 

 Full Exam - General                     Respiratory                    
respiratory effort/rhythm                    Overall:                    no 
retractions                    04/10/2017                    None              
  

 

 Full Exam - General                     Respiratory                    
respiratory effort/rhythm                    Overall:                    normal 
rate                    04/10/2017                    None                

 

 Full Exam - General                     Cardiovascular                    
extremities                    Overall:                    no clubbing         
           04/10/2017                    None                

 

 Full Exam - General                     Cardiovascular                    
auscultation of heart                    Overall:                    regular 
rate                    04/10/2017                    None                

 

 Full Exam - General                     Cardiovascular                    
auscultation of heart                    Overall:                    normal 
heart sounds                    04/10/2017                    None             
   

 

 Full Exam - General                     Abdomen                    
abdominal exam                    Overall:                    no tenderness    
                04/10/2017                    None                

 

 Full Exam - General                     Abdomen                    
abdominal exam                    Overall:                    normal bowel 
sounds                    04/10/2017                    None                

 

 Full Exam - General                     Musculoskeletal                    
spine, ribs and pelvis                    Overall:                    good 
posture                    04/10/2017                    None                

 

 Full Exam - General                     Musculoskeletal                    
head and neck                    Overall:                    head atraumatic   
                 04/10/2017                    None                

 

 Full Exam - General                     Musculoskeletal                    
head and neck                    Overall:                    cervical spine 
benign                    04/10/2017                    None                

 

 Full Exam - General                     Neurologic                    deep 
tendon reflexes                    Overall:                    deep tendon 
reflexes intact                    04/10/2017                    None          
      

 

 Full Exam - General                     Neurologic                    
cranial nerves                    Overall:                    crainial nerves 2 
- 12 grossly intact                    04/10/2017                    None      
          

 

 Full Exam - General                     Psychiatric                    
orientation/consciousness                    Overall:                    
oriented to person, place and time                    04/10/2017               
     None                

 

 Full Exam - General                     Psychiatric                    
mood and affect                    Overall:                    normal mood and 
affect                    04/10/2017                    None                

 

 Full Exam - General                     Constitutional                     
general appearance                    Overall:                    well 
developed                    2017                    None                

 

 Full Exam - General                     Constitutional                     
general appearance                    Overall:                    in no acute 
distress                    2017                    None                

 

 Full Exam - General                     Constitutional                     
general appearance                    Overall:                    well 
nourished                    2017                    None                

 

 Full Exam - General                     Eyes                    conjunctiva
/eyelids                    Overall:                    conjunctiva clear      
              2017                    None                

 

 Full Exam - General                     Eyes                    conjunctiva
/eyelids                    Overall:                    eyelids normal         
           2017                    None                

 

 Full Exam - General                     Ears/Nose/Throat                  
  lips/teeth/gingiva                    Overall:                    benign lips
                    2017                    None                

 

 Full Exam - General                     Ears/Nose/Throat                  
  oral cavity/pharynx/larynx                     Overall:                    
oral mucosa clear                    2017                    None        
        

 

 Full Exam - General                     Respiratory                    
auscultation                    Overall:                    breath sounds clear 
bilaterally                    2017                    None              
  

 

 Full Exam - General                     Respiratory                    
respiratory effort/rhythm                    Overall:                    no 
retractions                    2017                    None              
  

 

 Full Exam - General                     Respiratory                    
respiratory effort/rhythm                    Overall:                    normal 
rate                    2017                    None                

 

 Full Exam - General                     Cardiovascular                    
extremities                    Overall:                    no clubbing         
           2017                    None                

 

 Full Exam - General                     Cardiovascular                    
auscultation of heart                    Overall:                    regular 
rate                    2017                    None                

 

 Full Exam - General                     Cardiovascular                    
auscultation of heart                    Overall:                    normal 
heart sounds                    2017                    None             
   

 

 Full Exam - General                     Musculoskeletal                    
head and neck                    Overall:                    head atraumatic   
                 2017                    None                

 

 Full Exam - General                     Psychiatric                    
orientation/consciousness                    Overall:                    
oriented to person, place and time                    2017               
     None                

 

 Full Exam - General                     Psychiatric                    
mood and affect                    Overall:                    normal mood and 
affect                    2017                    None                

 

 Full Exam - General                     Psychiatric                    
appearance                    Overall:                    well-groomed, good 
eye contact                    2017                    None              
  

 

 Full Exam - General                     Constitutional                     
general appearance                    Development:                    well 
developed                    2017                    None                

 

 Full Exam - General                     Constitutional                     
general appearance                    Development:                    appears 
stated age                    2017                    None                

 

 Full Exam - General                     Constitutional                     
general appearance                    Hygiene/Attention to Grooming:           
         good hygiene                    2017                    None    
            

 

 Full Exam - General                     Eyes                    conjunctiva
/eyelids                    Overall:                    conjunctiva clear      
              2017                    None                

 

 Full Exam - General                     Eyes                    conjunctiva
/eyelids                    Overall:                    cornea clear           
         2017                    None                

 

 Full Exam - General                     Eyes                    conjunctiva
/eyelids                    Overall:                    eyelids normal         
           2017                    None                

 

 Full Exam - General                     Eyes                    pupils and 
irises                    Overall:                    pupils equal, round, 
reactive to light and accomodation                    2017               
     None                

 

 Full Exam - General                     Ears/Nose/Throat                  
  otoscopic exam                    Overall:                    external 
auditory canals clear                    2017                    None    
            

 

 Full Exam - General                     Ears/Nose/Throat                  
  otoscopic exam                    Overall:                    tympanic 
membranes clear                    2017                    None          
      

 

 Full Exam - General                     Ears/Nose/Throat                  
  lips/teeth/gingiva                    Overall:                    benign lips
                    2017                    None                

 

 Full Exam - General 1995                    Ears/Nose/Throat                  
  lips/teeth/gingiva                    Overall:                    normal 
dentition                    2017                    None                

 

 Full Exam - General                     Ears/Nose/Throat                  
  oral cavity/pharynx/larynx                     Overall:                    
oral mucosa clear                    2017                    None        
        

 

 Full Exam - General 1995                    Ears/Nose/Throat                  
  oral cavity/pharynx/larynx                     Overall:                    
oropharyngeal mucosa clear                    2017                    
None                

 

 Full Exam - General                     Ears/Nose/Throat                  
  oral cavity/pharynx/larynx                     Overall:                    
hypopharynx benign                    2017                    None       
         

 

 Full Exam - General                     Ears/Nose/Throat                  
  oral cavity/pharynx/larynx                     Overall:                    no 
masses                    2017                    None                

 

 Full Exam - General                     Respiratory                    
auscultation                    Overall:                    breath sounds clear 
bilaterally                    2017                    None              
  

 

 Full Exam - General                     Respiratory                    
respiratory effort/rhythm                    Overall:                    no 
retractions                    2017                    None              
  

 

 Full Exam - General                     Respiratory                    
respiratory effort/rhythm                    Overall:                    normal 
rate                    2017                    None                

 

 Full Exam - General                     Cardiovascular                    
extremities                    Overall:                    no clubbing         
           2017                    None                

 

 Full Exam - General                     Cardiovascular                    
auscultation of heart                    Overall:                    regular 
rate                    2017                    None                

 

 Full Exam - General                     Cardiovascular                    
auscultation of heart                    Overall:                    normal 
heart sounds                    2017                    None             
   

 

 Full Exam - General                     Abdomen                    
abdominal exam                    Overall:                    no tenderness    
                2017                    None                

 

 Full Exam - General                     Abdomen                    
abdominal exam                    Overall:                    normal bowel 
sounds                    2017                    None                

 

 Full Exam - General                     Musculoskeletal                    
spine, ribs and pelvis                    Overall:                    good 
posture                    2017                    None                

 

 Full Exam - General                     Musculoskeletal                    
head and neck                    Overall:                    head atraumatic   
                 2017                    None                

 

 Full Exam - General                     Musculoskeletal                    
head and neck                    Overall:                    cervical spine 
benign                    2017                    None                

 

 Full Exam - General                     Neurologic                    deep 
tendon reflexes                    Overall:                    deep tendon 
reflexes intact                    2017                    None          
      

 

 Full Exam - General                     Neurologic                    
cranial nerves                    Overall:                    crainial nerves 2 
- 12 grossly intact                    2017                    None      
          

 

 Full Exam - General                     Psychiatric                    
orientation/consciousness                    Overall:                    
oriented to person, place and time                    2017               
     None                

 

 Full Exam - General                     Psychiatric                    
mood and affect                    Overall:                    normal mood and 
affect                    2017                    None                

 

 Full Exam - General                     Constitutional                     
general appearance                    Development:                    well 
developed                    10/03/2016                    None                

 

 Full Exam - General                     Constitutional                     
general appearance                    Development:                    appears 
stated age                    10/03/2016                    None                

 

 Full Exam - General                     Constitutional                     
general appearance                    Hygiene/Attention to Grooming:           
         good hygiene                    10/03/2016                    None    
            

 

 Full Exam - General                     Eyes                    conjunctiva
/eyelids                    Overall:                    conjunctiva clear      
              10/03/2016                    None                

 

 Full Exam - General                     Eyes                    conjunctiva
/eyelids                    Overall:                    cornea clear           
         10/03/2016                    None                

 

 Full Exam - General                     Eyes                    conjunctiva
/eyelids                    Overall:                    eyelids normal         
           10/03/2016                    None                

 

 Full Exam - General                     Eyes                    pupils and 
irises                    Overall:                    pupils equal, round, 
reactive to light and accomodation                    10/03/2016               
     None                

 

 Full Exam - General                     Ears/Nose/Throat                  
  otoscopic exam                    Overall:                    external 
auditory canals clear                    10/03/2016                    None    
            

 

 Full Exam - General                     Ears/Nose/Throat                  
  otoscopic exam                    Overall:                    tympanic 
membranes clear                    10/03/2016                    None          
      

 

 Full Exam - General                     Ears/Nose/Throat                  
  lips/teeth/gingiva                    Overall:                    benign lips
                    10/03/2016                    None                

 

 Full Exam - General                     Ears/Nose/Throat                  
  lips/teeth/gingiva                    Overall:                    normal 
dentition                    10/03/2016                    None                

 

 Full Exam - General                     Ears/Nose/Throat                  
  oral cavity/pharynx/larynx                     Overall:                    
oral mucosa clear                    10/03/2016                    None        
        

 

 Full Exam - General                     Ears/Nose/Throat                  
  oral cavity/pharynx/larynx                     Overall:                    
oropharyngeal mucosa clear                    10/03/2016                    
None                

 

 Full Exam - General                     Ears/Nose/Throat                  
  oral cavity/pharynx/larynx                     Overall:                    
hypopharynx benign                    10/03/2016                    None       
         

 

 Full Exam - General                     Ears/Nose/Throat                  
  oral cavity/pharynx/larynx                     Overall:                    no 
masses                    10/03/2016                    None                

 

 Full Exam - General                     Respiratory                    
auscultation                    Overall:                    breath sounds clear 
bilaterally                    10/03/2016                    None              
  

 

 Full Exam - General                     Respiratory                    
respiratory effort/rhythm                    Overall:                    no 
retractions                    10/03/2016                    None              
  

 

 Full Exam - General                     Respiratory                    
respiratory effort/rhythm                    Overall:                    normal 
rate                    10/03/2016                    None                

 

 Full Exam - General                     Cardiovascular                    
extremities                    Overall:                    no clubbing         
           10/03/2016                    None                

 

 Full Exam - General                     Cardiovascular                    
auscultation of heart                    Overall:                    regular 
rate                    10/03/2016                    None                

 

 Full Exam - General                     Cardiovascular                    
auscultation of heart                    Overall:                    normal 
heart sounds                    10/03/2016                    None             
   

 

 Full Exam - General                     Abdomen                    
abdominal exam                    Overall:                    no tenderness    
                10/03/2016                    None                

 

 Full Exam - General                     Abdomen                    
abdominal exam                    Overall:                    normal bowel 
sounds                    10/03/2016                    None                

 

 Full Exam - General                     Musculoskeletal                    
spine, ribs and pelvis                    Overall:                    good 
posture                    10/03/2016                    None                

 

 Full Exam - General                     Musculoskeletal                    
head and neck                    Overall:                    head atraumatic   
                 10/03/2016                    None                

 

 Full Exam - General                     Musculoskeletal                    
head and neck                    Overall:                    cervical spine 
benign                    10/03/2016                    None                

 

 Full Exam - General                     Neurologic                    deep 
tendon reflexes                    Overall:                    deep tendon 
reflexes intact                    10/03/2016                    None          
      

 

 Full Exam - General                     Neurologic                    
cranial nerves                    Overall:                    crainial nerves 2 
- 12 grossly intact                    10/03/2016                    None      
          

 

 Full Exam - General                     Psychiatric                    
orientation/consciousness                    Overall:                    
oriented to person, place and time                    10/03/2016               
     None                

 

 Full Exam - General                     Psychiatric                    
mood and affect                    Overall:                    normal mood and 
affect                    10/03/2016                    None                

 

 Full Exam - General                     Constitutional                     
general appearance                    Development:                    well 
developed                    2016                    None                

 

 Full Exam - General                     Constitutional                     
general appearance                    Development:                    appears 
stated age                    2016                    None                

 

 Full Exam - General                     Constitutional                     
general appearance                    Hygiene/Attention to Grooming:           
         good hygiene                    2016                    None    
            

 

 Full Exam - General                     Eyes                    conjunctiva
/eyelids                    Overall:                    conjunctiva clear      
              2016                    None                

 

 Full Exam - General                     Eyes                    conjunctiva
/eyelids                    Overall:                    cornea clear           
         2016                    None                

 

 Full Exam - General                     Eyes                    conjunctiva
/eyelids                    Overall:                    eyelids normal         
           2016                    None                

 

 Full Exam - General                     Eyes                    pupils and 
irises                    Overall:                    pupils equal, round, 
reactive to light and accomodation                    2016               
     None                

 

 Full Exam - General                     Ears/Nose/Throat                  
  otoscopic exam                    Overall:                    external 
auditory canals clear                    2016                    None    
            

 

 Full Exam - General                     Ears/Nose/Throat                  
  otoscopic exam                    Overall:                    tympanic 
membranes clear                    2016                    None          
      

 

 Full Exam - General                     Ears/Nose/Throat                  
  lips/teeth/gingiva                    Overall:                    benign lips
                    2016                    None                

 

 Full Exam - General                     Ears/Nose/Throat                  
  lips/teeth/gingiva                    Overall:                    normal 
dentition                    2016                    None                

 

 Full Exam - General                     Ears/Nose/Throat                  
  oral cavity/pharynx/larynx                     Overall:                    
oral mucosa clear                    2016                    None        
        

 

 Full Exam - General                     Ears/Nose/Throat                  
  oral cavity/pharynx/larynx                     Overall:                    
oropharyngeal mucosa clear                    2016                    
None                

 

 Full Exam - General                     Ears/Nose/Throat                  
  oral cavity/pharynx/larynx                     Overall:                    
hypopharynx benign                    2016                    None       
         

 

 Full Exam - General                     Ears/Nose/Throat                  
  oral cavity/pharynx/larynx                     Overall:                    no 
masses                    2016                    None                

 

 Full Exam - General                     Respiratory                    
auscultation                    Overall:                    breath sounds clear 
bilaterally                    2016                    None              
  

 

 Full Exam - General                     Respiratory                    
respiratory effort/rhythm                    Overall:                    no 
retractions                    2016                    None              
  

 

 Full Exam - General                     Respiratory                    
respiratory effort/rhythm                    Overall:                    normal 
rate                    2016                    None                

 

 Full Exam - General                     Cardiovascular                    
extremities                    Overall:                    no clubbing         
           2016                    None                

 

 Full Exam - General                     Cardiovascular                    
auscultation of heart                    Overall:                    regular 
rate                    2016                    None                

 

 Full Exam - General                     Cardiovascular                    
auscultation of heart                    Overall:                    normal 
heart sounds                    2016                    None             
   

 

 Full Exam - General                     Abdomen                    
abdominal exam                    Overall:                    no tenderness    
                2016                    None                

 

 Full Exam - General                     Abdomen                    
abdominal exam                    Overall:                    normal bowel 
sounds                    2016                    None                

 

 Full Exam - General                     Musculoskeletal                    
spine, ribs and pelvis                    Overall:                    good 
posture                    2016                    None                

 

 Full Exam - General                     Musculoskeletal                    
head and neck                    Overall:                    head atraumatic   
                 2016                    None                

 

 Full Exam - General                     Musculoskeletal                    
head and neck                    Overall:                    cervical spine 
benign                    2016                    None                

 

 Full Exam - General                     Neurologic                    deep 
tendon reflexes                    Overall:                    deep tendon 
reflexes intact                    2016                    None          
      

 

 Full Exam - General                     Neurologic                    
cranial nerves                    Overall:                    crainial nerves 2 
- 12 grossly intact                    2016                    None      
          

 

 Full Exam - General                     Psychiatric                    
orientation/consciousness                    Overall:                    
oriented to person, place and time                    2016               
     None                

 

 Full Exam - General                     Psychiatric                    
mood and affect                    Overall:                    normal mood and 
affect                    2016                    None                

 

 Full Exam - General                     Constitutional                     
general appearance                    Development:                    well 
developed                    2016                    None                

 

 Full Exam - General                     Constitutional                     
general appearance                    Development:                    appears 
stated age                    2016                    None                

 

 Full Exam - General                     Constitutional                     
general appearance                    Hygiene/Attention to Grooming:           
         good hygiene                    2016                    None    
            

 

 Full Exam - General                     Eyes                    conjunctiva
/eyelids                    Overall:                    conjunctiva clear      
              2016                    None                

 

 Full Exam - General                     Eyes                    conjunctiva
/eyelids                    Overall:                    cornea clear           
         2016                    None                

 

 Full Exam - General                     Eyes                    conjunctiva
/eyelids                    Overall:                    eyelids normal         
           2016                    None                

 

 Full Exam - General                     Eyes                    pupils and 
irises                    Overall:                    pupils equal, round, 
reactive to light and accomodation                    2016               
     None                

 

 Full Exam - General                     Ears/Nose/Throat                  
  otoscopic exam                    Overall:                    external 
auditory canals clear                    2016                    None    
            

 

 Full Exam - General                     Ears/Nose/Throat                  
  otoscopic exam                    Overall:                    tympanic 
membranes clear                    2016                    None          
      

 

 Full Exam - General                     Ears/Nose/Throat                  
  lips/teeth/gingiva                    Overall:                    benign lips
                    2016                    None                

 

 Full Exam - General                     Ears/Nose/Throat                  
  lips/teeth/gingiva                    Overall:                    normal 
dentition                    2016                    None                

 

 Full Exam - General                     Ears/Nose/Throat                  
  oral cavity/pharynx/larynx                     Overall:                    
oral mucosa clear                    2016                    None        
        

 

 Full Exam - General                     Ears/Nose/Throat                  
  oral cavity/pharynx/larynx                     Overall:                    
oropharyngeal mucosa clear                    2016                    
None                

 

 Full Exam - General                     Ears/Nose/Throat                  
  oral cavity/pharynx/larynx                     Overall:                    
hypopharynx benign                    2016                    None       
         

 

 Full Exam - General                     Ears/Nose/Throat                  
  oral cavity/pharynx/larynx                     Overall:                    no 
masses                    2016                    None                

 

 Full Exam - General                     Respiratory                    
auscultation                    Overall:                    breath sounds clear 
bilaterally                    2016                    None              
  

 

 Full Exam - General                     Respiratory                    
respiratory effort/rhythm                    Overall:                    no 
retractions                    2016                    None              
  

 

 Full Exam - General                     Respiratory                    
respiratory effort/rhythm                    Overall:                    normal 
rate                    2016                    None                

 

 Full Exam - General                     Cardiovascular                    
extremities                    Overall:                    no clubbing         
           2016                    None                

 

 Full Exam - General                     Cardiovascular                    
auscultation of heart                    Overall:                    regular 
rate                    2016                    None                

 

 Full Exam - General                     Cardiovascular                    
auscultation of heart                    Overall:                    normal 
heart sounds                    2016                    None             
   

 

 Full Exam - General                     Abdomen                    
abdominal exam                    Overall:                    no tenderness    
                2016                    None                

 

 Full Exam - General                     Abdomen                    
abdominal exam                    Overall:                    normal bowel 
sounds                    2016                    None                

 

 Full Exam - General                     Musculoskeletal                    
spine, ribs and pelvis                    Overall:                    good 
posture                    2016                    None                

 

 Full Exam - General                     Musculoskeletal                    
head and neck                    Overall:                    head atraumatic   
                 2016                    None                

 

 Full Exam - General                     Musculoskeletal                    
head and neck                    Overall:                    cervical spine 
benign                    2016                    None                

 

 Full Exam - General                     Neurologic                    deep 
tendon reflexes                    Overall:                    deep tendon 
reflexes intact                    2016                    None          
      

 

 Full Exam - General                     Neurologic                    
cranial nerves                    Overall:                    crainial nerves 2 
- 12 grossly intact                    2016                    None      
          

 

 Full Exam - General                     Psychiatric                    
orientation/consciousness                    Overall:                    
oriented to person, place and time                    2016               
     None                

 

 Full Exam - General                     Psychiatric                    
mood and affect                    Overall:                    normal mood and 
affect                    2016                    None                

 

 Full Exam - General                     Constitutional                     
general appearance                    Development:                    well 
developed                    2016                    None                

 

 Full Exam - General                     Constitutional                     
general appearance                    Development:                    appears 
stated age                    2016                    None                

 

 Full Exam - General                     Constitutional                     
general appearance                    Hygiene/Attention to Grooming:           
         good hygiene                    2016                    None    
            

 

 Full Exam - General                     Eyes                    conjunctiva
/eyelids                    Overall:                    conjunctiva clear      
              2016                    None                

 

 Full Exam - General                     Eyes                    conjunctiva
/eyelids                    Overall:                    cornea clear           
         2016                    None                

 

 Full Exam - General                     Eyes                    conjunctiva
/eyelids                    Overall:                    eyelids normal         
           2016                    None                

 

 Full Exam - General                     Eyes                    pupils and 
irises                    Overall:                    pupils equal, round, 
reactive to light and accomodation                    2016               
     None                

 

 Full Exam - General                     Ears/Nose/Throat                  
  otoscopic exam                    Overall:                    external 
auditory canals clear                    2016                    None    
            

 

 Full Exam - General                     Ears/Nose/Throat                  
  otoscopic exam                    Overall:                    tympanic 
membranes clear                    2016                    None          
      

 

 Full Exam - General                     Ears/Nose/Throat                  
  lips/teeth/gingiva                    Overall:                    benign lips
                    2016                    None                

 

 Full Exam - General                     Ears/Nose/Throat                  
  lips/teeth/gingiva                    Overall:                    normal 
dentition                    2016                    None                

 

 Full Exam - General                     Ears/Nose/Throat                  
  oral cavity/pharynx/larynx                     Overall:                    
oral mucosa clear                    2016                    None        
        

 

 Full Exam - General                     Ears/Nose/Throat                  
  oral cavity/pharynx/larynx                     Overall:                    
oropharyngeal mucosa clear                    2016                    
None                

 

 Full Exam - General                     Ears/Nose/Throat                  
  oral cavity/pharynx/larynx                     Overall:                    
hypopharynx benign                    2016                    None       
         

 

 Full Exam - General                     Ears/Nose/Throat                  
  oral cavity/pharynx/larynx                     Overall:                    no 
masses                    2016                    None                

 

 Full Exam - General                     Respiratory                    
auscultation                    Overall:                    breath sounds clear 
bilaterally                    2016                    None              
  

 

 Full Exam - General                     Respiratory                    
respiratory effort/rhythm                    Overall:                    no 
retractions                    2016                    None              
  

 

 Full Exam - General                     Respiratory                    
respiratory effort/rhythm                    Overall:                    normal 
rate                    2016                    None                

 

 Full Exam - General                     Cardiovascular                    
extremities                    Overall:                    no clubbing         
           2016                    None                

 

 Full Exam - General                     Cardiovascular                    
auscultation of heart                    Overall:                    regular 
rate                    2016                    None                

 

 Full Exam - General                     Cardiovascular                    
auscultation of heart                    Overall:                    normal 
heart sounds                    2016                    None             
   

 

 Full Exam - General                     Abdomen                    
abdominal exam                    Overall:                    no tenderness    
                2016                    None                

 

 Full Exam - General                     Abdomen                    
abdominal exam                    Overall:                    normal bowel 
sounds                    2016                    None                

 

 Full Exam - General                     Musculoskeletal                    
head and neck                    Overall:                    head atraumatic   
                 2016                    None                

 

 Full Exam - General                     Musculoskeletal                    
head and neck                    Overall:                    cervical spine 
benign                    2016                    None                

 

 Full Exam - General                     Neurologic                    deep 
tendon reflexes                    Overall:                    deep tendon 
reflexes intact                    2016                    None          
      

 

 Full Exam - General                     Neurologic                    
cranial nerves                    Overall:                    crainial nerves 2 
- 12 grossly intact                    2016                    None      
          

 

 Full Exam - General                     Psychiatric                    
orientation/consciousness                    Overall:                    
oriented to person, place and time                    2016               
     None                

 

 Full Exam - General                     Psychiatric                    
mood and affect                    Overall:                    normal mood and 
affect                    2016                    None                

 

 Full Exam - General                     Musculoskeletal                    
spine, ribs and pelvis                    Overall:                    good 
posture                    2016                    None                

 

 Full Exam - General                     Constitutional                     
general appearance                    Development:                    well 
developed                    2015                    None                

 

 Full Exam - General                     Constitutional                     
general appearance                    Development:                    appears 
stated age                    2015                    None                

 

 Full Exam - General                     Constitutional                     
general appearance                    Hygiene/Attention to Grooming:           
         good hygiene                    2015                    None    
            

 

 Full Exam - General                     Eyes                    conjunctiva
/eyelids                    Overall:                    conjunctiva clear      
              2015                    None                

 

 Full Exam - General                     Eyes                    conjunctiva
/eyelids                    Overall:                    cornea clear           
         2015                    None                

 

 Full Exam - General                     Eyes                    conjunctiva
/eyelids                    Overall:                    eyelids normal         
           2015                    None                

 

 Full Exam - General                     Eyes                    pupils and 
irises                    Overall:                    pupils equal, round, 
reactive to light and accomodation                    2015               
     None                

 

 Full Exam - General                     Ears/Nose/Throat                  
  otoscopic exam                    Overall:                    external 
auditory canals clear                    2015                    None    
            

 

 Full Exam - General                     Ears/Nose/Throat                  
  otoscopic exam                    Overall:                    tympanic 
membranes clear                    2015                    None          
      

 

 Full Exam - General                     Ears/Nose/Throat                  
  lips/teeth/gingiva                    Overall:                    benign lips
                    2015                    None                

 

 Full Exam - General                     Ears/Nose/Throat                  
  lips/teeth/gingiva                    Overall:                    normal 
dentition                    2015                    None                

 

 Full Exam - General                     Ears/Nose/Throat                  
  oral cavity/pharynx/larynx                     Overall:                    
oral mucosa clear                    2015                    None        
        

 

 Full Exam - General                     Ears/Nose/Throat                  
  oral cavity/pharynx/larynx                     Overall:                    
oropharyngeal mucosa clear                    2015                    
None                

 

 Full Exam - General                     Ears/Nose/Throat                  
  oral cavity/pharynx/larynx                     Overall:                    
hypopharynx benign                    2015                    None       
         

 

 Full Exam - General                     Ears/Nose/Throat                  
  oral cavity/pharynx/larynx                     Overall:                    no 
masses                    2015                    None                

 

 Full Exam - General                     Respiratory                    
auscultation                    Overall:                    breath sounds clear 
bilaterally                    2015                    None              
  

 

 Full Exam - General                     Respiratory                    
respiratory effort/rhythm                    Overall:                    no 
retractions                    2015                    None              
  

 

 Full Exam - General                     Respiratory                    
respiratory effort/rhythm                    Overall:                    normal 
rate                    2015                    None                

 

 Full Exam - General                     Cardiovascular                    
extremities                    Overall:                    no clubbing         
           2015                    None                

 

 Full Exam - General                     Cardiovascular                    
auscultation of heart                    Overall:                    regular 
rate                    2015                    None                

 

 Full Exam - General                     Cardiovascular                    
auscultation of heart                    Overall:                    normal 
heart sounds                    2015                    None             
   

 

 Full Exam - General                     Abdomen                    
abdominal exam                    Overall:                    no tenderness    
                2015                    None                

 

 Full Exam - General                     Abdomen                    
abdominal exam                    Overall:                    normal bowel 
sounds                    2015                    None                

 

 Full Exam - General                     Lymphatic                    neck 
nodes                    Overall:                    anterior cervical chain 
benign                    2015                    None                

 

 Full Exam - General                     Lymphatic                    neck 
nodes                    Overall:                    posterior cervical chain 
benign                    2015                    None                

 

 Full Exam - General                     Musculoskeletal                    
spine, ribs and pelvis                    Overall:                    spine 
benign                    2015                    None                

 

 Full Exam - General                     Musculoskeletal                    
spine, ribs and pelvis                    Overall:                    
sacroiliac joint benign                    2015                    None  
              

 

 Full Exam - General                     Musculoskeletal                    
spine, ribs and pelvis                    Overall:                    good 
posture                    2015                    None                

 

 Full Exam - General                     Musculoskeletal                    
gait and station                    Overall:                    normal gait    
                2015                    None                

 

 Full Exam - General                     Musculoskeletal                    
gait and station                    Overall:                    normal station 
                   2015                    None                

 

 Full Exam - General                     Musculoskeletal                    
head and neck                    Overall:                    head atraumatic   
                 2015                    None                

 

 Full Exam - General                     Musculoskeletal                    
head and neck                    Overall:                    cervical spine 
benign                    2015                    None                

 

 Full Exam - General                     Integument                    
inspection of skin                    Dermatitis:                    pustule   
                 2015                    None                

 

 Full Exam - General                     Integument                    
inspection of skin                    Dermatitis:                    petechiae 
                   2015                    None                

 

 Full Exam - General                     Integument                    
inspection of skin                    Dermatitis:                    erythema  
                  2015                    None                

 

 Full Exam - General                     Integument                    
inspection of skin                    Location:                    face        
            2015                    on cheeks bilaterally - across nasal 
bridge to cheeks bilaterally                

 

 Full Exam - General                     Neurologic                    deep 
tendon reflexes                    Overall:                    deep tendon 
reflexes intact                    2015                    None          
      

 

 Full Exam - General                     Neurologic                    
cranial nerves                    Overall:                    crainial nerves 2 
- 12 grossly intact                    2015                    None      
          

 

 Full Exam - General                     Psychiatric                    
orientation/consciousness                    Overall:                    
oriented to person, place and time                    2015               
     None                

 

 Full Exam - General                     Psychiatric                    
mood and affect                    Overall:                    normal mood and 
affect                    2015                    None                

 

 Full Exam - General                     Constitutional                     
general appearance                    Development:                    well 
developed                    2015                    None                

 

 Full Exam - General                     Constitutional                     
general appearance                    Development:                    appears 
stated age                    2015                    None                

 

 Full Exam - General                     Constitutional                     
general appearance                    Hygiene/Attention to Grooming:           
         good hygiene                    2015                    None    
            

 

 Full Exam - General                     Eyes                    conjunctiva
/eyelids                    Overall:                    conjunctiva clear      
              2015                    None                

 

 Full Exam - General                     Eyes                    pupils and 
irises                    Overall:                    pupils equal, round, 
reactive to light and accomodation                    2015               
     None                

 

 Full Exam - General                     Ears/Nose/Throat                  
  otoscopic exam                    Overall:                    external 
auditory canals clear                    2015                    None    
            

 

 Full Exam - General                     Ears/Nose/Throat                  
  otoscopic exam                    Overall:                    tympanic 
membranes clear                    2015                    None          
      

 

 Full Exam - General                     Ears/Nose/Throat                  
  lips/teeth/gingiva                    Overall:                    benign lips
                    2015                    None                

 

 Full Exam - General                     Ears/Nose/Throat                  
  lips/teeth/gingiva                    Overall:                    normal 
dentition                    2015                    None                

 

 Full Exam - General                     Ears/Nose/Throat                  
  oral cavity/pharynx/larynx                     Overall:                    
oral mucosa clear                    2015                    None        
        

 

 Full Exam - General                     Ears/Nose/Throat                  
  oral cavity/pharynx/larynx                     Overall:                    
oropharyngeal mucosa clear                    2015                    
None                

 

 Full Exam - General                     Ears/Nose/Throat                  
  oral cavity/pharynx/larynx                     Overall:                    
hypopharynx benign                    2015                    None       
         

 

 Full Exam - General                     Respiratory                    
auscultation                    Overall:                    breath sounds clear 
bilaterally                    2015                    None              
  

 

 Full Exam - General                     Respiratory                    
respiratory effort/rhythm                    Overall:                    no 
retractions                    2015                    None              
  

 

 Full Exam - General                     Respiratory                    
respiratory effort/rhythm                    Overall:                    normal 
rate                    2015                    None                

 

 Full Exam - General                     Cardiovascular                    
auscultation of heart                    Overall:                    regular 
rate                    2015                    None                

 

 Full Exam - General                     Cardiovascular                    
auscultation of heart                    Overall:                    normal 
heart sounds                    2015                    None             
   

 

 Full Exam - General                     Psychiatric                    
orientation/consciousness                    Overall:                    
oriented to person, place and time                    2015               
     None                

 

 Full Exam - General                     Psychiatric                    
mood and affect                    Overall:                    normal mood and 
affect                    2015                    None                

 

 Full Exam - General                     Musculoskeletal                    
spine, ribs and pelvis                    Sacroiliac joints:                    
tender left sacroiliac joint                    2015                    
None                

 

 Full Exam - General                     Musculoskeletal                    
spine, ribs and pelvis                    Sacroiliac joints:                    
tender right sacroiliac joint                    2015                    
None                

 

 Full Exam - General                     Constitutional                     
general appearance                    Development:                    well 
developed                    2015                    None                

 

 Full Exam - General                     Constitutional                     
general appearance                    Development:                    appears 
stated age                    2015                    None                

 

 Full Exam - General                     Constitutional                     
general appearance                    Hygiene/Attention to Grooming:           
         good hygiene                    2015                    None    
            

 

 Full Exam - General                     Eyes                    conjunctiva
/eyelids                    Overall:                    conjunctiva clear      
              2015                    None                

 

 Full Exam - General                     Eyes                    conjunctiva
/eyelids                    Overall:                    cornea clear           
         2015                    None                

 

 Full Exam - General                     Eyes                    conjunctiva
/eyelids                    Overall:                    eyelids normal         
           2015                    None                

 

 Full Exam - General                     Eyes                    pupils and 
irises                    Overall:                    pupils equal, round, 
reactive to light and accomodation                    2015               
     None                

 

 Full Exam - General                     Ears/Nose/Throat                  
  otoscopic exam                    Overall:                    external 
auditory canals clear                    2015                    None    
            

 

 Full Exam - General                     Ears/Nose/Throat                  
  otoscopic exam                    Overall:                    tympanic 
membranes clear                    2015                    None          
      

 

 Full Exam - General                     Ears/Nose/Throat                  
  lips/teeth/gingiva                    Overall:                    benign lips
                    2015                    None                

 

 Full Exam - General                     Ears/Nose/Throat                  
  lips/teeth/gingiva                    Overall:                    normal 
dentition                    2015                    None                

 

 Full Exam - General                     Ears/Nose/Throat                  
  oral cavity/pharynx/larynx                     Overall:                    
oral mucosa clear                    2015                    None        
        

 

 Full Exam - General                     Ears/Nose/Throat                  
  oral cavity/pharynx/larynx                     Overall:                    
oropharyngeal mucosa clear                    2015                    
None                

 

 Full Exam - General                     Ears/Nose/Throat                  
  oral cavity/pharynx/larynx                     Overall:                    
hypopharynx benign                    2015                    None       
         

 

 Full Exam - General                     Ears/Nose/Throat                  
  oral cavity/pharynx/larynx                     Overall:                    no 
masses                    2015                    None                

 

 Full Exam - General                     Respiratory                    
auscultation                    Overall:                    breath sounds clear 
bilaterally                    2015                    None              
  

 

 Full Exam - General                     Respiratory                    
respiratory effort/rhythm                    Overall:                    no 
retractions                    2015                    None              
  

 

 Full Exam - General                     Respiratory                    
respiratory effort/rhythm                    Overall:                    normal 
rate                    2015                    None                

 

 Full Exam - General                     Cardiovascular                    
extremities                    Overall:                    no clubbing         
           2015                    None                

 

 Full Exam - General                     Cardiovascular                    
auscultation of heart                    Overall:                    regular 
rate                    2015                    None                

 

 Full Exam - General                     Cardiovascular                    
auscultation of heart                    Overall:                    normal 
heart sounds                    2015                    None             
   

 

 Full Exam - General                     Abdomen                    
abdominal exam                    Overall:                    no tenderness    
                2015                    None                

 

 Full Exam - General                     Abdomen                    
abdominal exam                    Overall:                    normal bowel 
sounds                    2015                    None                

 

 Full Exam - General                     Lymphatic                    neck 
nodes                    Overall:                    anterior cervical chain 
benign                    2015                    None                

 

 Full Exam - General                     Lymphatic                    neck 
nodes                    Overall:                    posterior cervical chain 
benign                    2015                    None                

 

 Full Exam - General                     Musculoskeletal                    
spine, ribs and pelvis                    Overall:                    spine 
benign                    2015                    None                

 

 Full Exam - General                     Musculoskeletal                    
spine, ribs and pelvis                    Overall:                    
sacroiliac joint benign                    2015                    None  
              

 

 Full Exam - General                     Musculoskeletal                    
spine, ribs and pelvis                    Overall:                    good 
posture                    2015                    None                

 

 Full Exam - General                     Musculoskeletal                    
gait and station                    Overall:                    normal gait    
                2015                    None                

 

 Full Exam - General                     Musculoskeletal                    
gait and station                    Overall:                    normal station 
                   2015                    None                

 

 Full Exam - General                     Musculoskeletal                    
head and neck                    Overall:                    head atraumatic   
                 2015                    None                

 

 Full Exam - General                     Musculoskeletal                    
head and neck                    Overall:                    cervical spine 
benign                    2015                    None                

 

 Full Exam - General                     Integument                    
inspection of skin                    Dermatitis:                    pustule   
                 2015                    None                

 

 Full Exam - General                     Integument                    
inspection of skin                    Dermatitis:                    petechiae 
                   2015                    None                

 

 Full Exam - General                     Integument                    
inspection of skin                    Dermatitis:                    erythema  
                  2015                    None                

 

 Full Exam - General                     Integument                    
inspection of skin                    Location:                    face        
            2015                    on cheeks bilaterally - across nasal 
bridge to cheeks bilaterally                

 

 Full Exam - General                     Neurologic                    deep 
tendon reflexes                    Overall:                    deep tendon 
reflexes intact                    2015                    None          
      

 

 Full Exam - General                     Neurologic                    
cranial nerves                    Overall:                    crainial nerves 2 
- 12 grossly intact                    2015                    None      
          

 

 Full Exam - General                     Psychiatric                    
orientation/consciousness                    Overall:                    
oriented to person, place and time                    2015               
     None                

 

 Full Exam - General                     Psychiatric                    
mood and affect                    Overall:                    normal mood and 
affect                    2015                    None                

 

 Full Exam - General                     Constitutional                     
general appearance                    Development:                    appears 
stated age                    2015                    None                

 

 Full Exam - General                     Constitutional                     
general appearance                    Development:                    well 
developed                    2015                    None                

 

 Full Exam - General                     Constitutional                     
general appearance                    Hygiene/Attention to Grooming:           
         good hygiene                    2015                    None    
            

 

 Full Exam - General                     Eyes                    conjunctiva
/eyelids                    Overall:                    conjunctiva clear      
              2015                    None                

 

 Full Exam - General                     Eyes                    conjunctiva
/eyelids                    Overall:                    cornea clear           
         2015                    None                

 

 Full Exam - General                     Eyes                    conjunctiva
/eyelids                    Overall:                    eyelids normal         
           2015                    None                

 

 Full Exam - General                     Eyes                    pupils and 
irises                    Overall:                    pupils equal, round, 
reactive to light and accomodation                    2015               
     None                

 

 Full Exam - General                     Ears/Nose/Throat                  
  otoscopic exam                    Overall:                    external 
auditory canals clear                    2015                    None    
            

 

 Full Exam - General                     Ears/Nose/Throat                  
  otoscopic exam                    Overall:                    tympanic 
membranes clear                    2015                    None          
      

 

 Full Exam - General                     Ears/Nose/Throat                  
  lips/teeth/gingiva                    Overall:                    benign lips
                    2015                    None                

 

 Full Exam - General                     Ears/Nose/Throat                  
  lips/teeth/gingiva                    Overall:                    normal 
dentition                    2015                    None                

 

 Full Exam - General                     Ears/Nose/Throat                  
  oral cavity/pharynx/larynx                     Overall:                    
hypopharynx benign                    2015                    None       
         

 

 Full Exam - General                     Ears/Nose/Throat                  
  oral cavity/pharynx/larynx                     Overall:                    no 
masses                    2015                    None                

 

 Full Exam - General                     Ears/Nose/Throat                  
  oral cavity/pharynx/larynx                     Overall:                    
oral mucosa clear                    2015                    None        
        

 

 Full Exam - General                     Ears/Nose/Throat                  
  oral cavity/pharynx/larynx                     Overall:                    
oropharyngeal mucosa clear                    2015                    
None                

 

 Full Exam - General                     Respiratory                    
auscultation                    Overall:                    breath sounds clear 
bilaterally                    2015                    None              
  

 

 Full Exam - General                     Respiratory                    
respiratory effort/rhythm                    Overall:                    no 
retractions                    2015                    None              
  

 

 Full Exam - General                     Respiratory                    
respiratory effort/rhythm                    Overall:                    normal 
rate                    2015                    None                

 

 Full Exam - General                     Cardiovascular                    
extremities                    Overall:                    no clubbing         
           2015                    None                

 

 Full Exam - General                     Cardiovascular                    
auscultation of heart                    Overall:                    normal 
heart sounds                    2015                    None             
   

 

 Full Exam - General                     Cardiovascular                    
auscultation of heart                    Overall:                    regular 
rate                    2015                    None                

 

 Full Exam - General                     Abdomen                    
abdominal exam                    Overall:                    no tenderness    
                2015                    None                

 

 Full Exam - General                     Abdomen                    
abdominal exam                    Overall:                    normal bowel 
sounds                    2015                    None                

 

 Full Exam - General                     Neurologic                    deep 
tendon reflexes                    Overall:                    deep tendon 
reflexes intact                    2015                    None          
      

 

 Full Exam - General                     Neurologic                    
cranial nerves                    Overall:                    crainial nerves 2 
- 12 grossly intact                    2015                    None      
          

 

 Full Exam - General                     Psychiatric                    
orientation/consciousness                    Overall:                    
oriented to person, place and time                    2015               
     None                

 

 Full Exam - General                     Psychiatric                    
mood and affect                    Overall:                    normal mood and 
affect                    2015                    None                

 

 Full Exam - General                     Integument                    
inspection of skin                    Dermatitis:                    pustule   
                 2015                    None                

 

 Full Exam - General                     Integument                    
inspection of skin                    Dermatitis:                    petechiae 
                   2015                    None                

 

 Full Exam - General                     Integument                    
inspection of skin                    Dermatitis:                    erythema  
                  2015                    None                

 

 Full Exam - General                     Integument                    
inspection of skin                    Location:                    face        
            2015                    on cheeks bilaterally - across nasal 
bridge to cheeks bilaterally                

 

 Full Exam - General                     Musculoskeletal                    
head and neck                    Overall:                    cervical spine 
benign                    2015                    None                

 

 Full Exam - General                     Musculoskeletal                    
head and neck                    Overall:                    head atraumatic   
                 2015                    None                

 

 Full Exam - General                     Musculoskeletal                    
gait and station                    Overall:                    normal station 
                   2015                    None                

 

 Full Exam - General                     Musculoskeletal                    
gait and station                    Overall:                    normal gait    
                2015                    None                

 

 Full Exam - General                     Musculoskeletal                    
spine, ribs and pelvis                    Overall:                    good 
posture                    2015                    None                

 

 Full Exam - General                     Musculoskeletal                    
spine, ribs and pelvis                    Overall:                    
sacroiliac joint benign                    2015                    None  
              

 

 Full Exam - General                     Musculoskeletal                    
spine, ribs and pelvis                    Overall:                    spine 
benign                    2015                    None                

 

 Full Exam - General                     Lymphatic                    neck 
nodes                    Overall:                    anterior cervical chain 
benign                    2015                    None                

 

 Full Exam - General                     Lymphatic                    neck 
nodes                    Overall:                    posterior cervical chain 
benign                    2015                    None                



                                                                              



Procedures

                      





 Procedure                    Codes                    Date                

 

                     ADMIN INFLUENZA VIRUS VAC                                 
     CPT-4:                     10/16/2017                

 

                     FLU VAC NO PRSV 4 ALINE 3 YRS+                              
        CPT-4: 54250                    10/16/2017                

 

                     PPPS, SUBSEQ VISIT                                      CPT
-4:                     2017                

 

                     ADMIN PNEUMOCOCCAL VACCINE                                
      SNOMED CT: [id number]

CPT-4:                     10/03/2016                

 

                     PNEUMOCOCCAL VACC 13 ALINE IM                               
       SNOMED CT: [id number]

CPT-4: 80945                    10/03/2016                

 

                     ADMIN INFLUENZA VIRUS VAC                                 
     CPT-4:                     2016                

 

                     FLU VACC PRSV FREE INC ANTIG                              
        CPT-4: 96013                    2016                



                                                                               
                                                           



Vital Signs

                      





 Date                    Vital                









 2018                                        Blood Pressure 1: 130/70 Code
: 8480-6                                                          Blood 
Pressure 1: 146/76 Code: 8480-6                                                
          BMI: 33.7 Code: 36836-9                                              
            Heart Rate 1: 65 bpm                                               
           Height: 5'3"                                                        
  SpO2: 97%                                                          Weight: 
190 lbs                                   









 10/16/2017                                        Blood Pressure 1: 132/76 Code
: 8480-6                                                          BMI: 34.4 Code
: 44877-5                                                          Heart Rate 1
: 77 bpm                                                          Height: 5'3" 
                                                         SpO2: 94%             
                                             Weight: 194 lbs                   
                

 

 2017                                        Blood Pressure 1: 142/76 Code
: 8480-6                                                          BMI: 34.0 Code
: 21667-1                                                          Heart Rate 1
: 66 bpm                                                          Height: 5'3" 
                                                         SpO2: 95%             
                                             Weight: 192 lbs                   
                









 2017                                        Blood Pressure 1: 138/70 Code
: 8480-6                                                          BMI: 34.0 Code
: 54354-1                                                          Height: 5'3"
                                                          Weight: 192 lbs      
                             









 04/10/2017                                        Blood Pressure 1: 138/70 Code
: 8480-6                                                          BMI: 33.3 Code
: 05255-8                                                          Heart Rate 1
: 62 bpm                                                          Height: 5'3" 
                                                         SpO2: 98%             
                                             Weight: 188 lbs                   
                









 2017                                        Blood Pressure 1: 136/88 Code
: 8480-6                                                          BMI: 33.7 Code
: 39261-2                                                          Heart Rate 1
: 63 bpm                                                          Height: 5'3" 
                                                         SpO2: 97%             
                                             Waist Measure (cm): 84 cm         
                                                 Weight: 190 lbs               
                    









 2017                                        Blood Pressure 1: 144/78 Code
: 8480-6                                                          BMI: 33.1 Code
: 87811-8                                                          Heart Rate 1
: 59 bpm                                                          Height: 5'3" 
                                                         SpO2: 97%             
                                             Weight: 187 lbs                   
                

 

 10/03/2016                                        Blood Pressure 1: 136/70 Code
: 8480-6                                                          BMI: 32.9 Code
: 14519-2                                                          Heart Rate 1
: 62 bpm                                                          Height: 5'3" 
                                                         SpO2: 98%             
                                             Weight: 186 lbs                   
                

 

 2016                                        Blood Pressure 1: 128/72 Code
: 8480-6                                                          BMI: 32.4 Code
: 08491-2                                                          Heart Rate 1
: 86 bpm                                                          Height: 5'3" 
                                                         SpO2: 98%             
                                             Weight: 183 lbs                   
                

 

 2016                                        Blood Pressure 1: 139/78 Code
: 8480-6                                                          BMI: 32.8 Code
: 34760-3                                                          Heart Rate 1
: 81 bpm                                                          Height: 5'3" 
                                                         SpO2: 93%             
                                             Weight: 185 lbs                   
                

 

 2016                                        Blood Pressure 1: 124/70 Code
: 8480-6                                                          BMI: 32.4 Code
: 27706-4                                                          Heart Rate 1
: 72 bpm                                                          Height: 5'3" 
                                                         SpO2: 96%             
                                             Weight: 183 lbs                   
                

 

 2015                                        Blood Pressure 1: 122/80 Code
: 8480-6                                                          BMI: 31.4 Code
: 60475-3                                                          Heart Rate 1
: 72 bpm                                                          Height: 5'3" 
                                                         SpO2: 98%             
                                             Weight: 177 lbs                   
                

 

 2015                                        Blood Pressure 1: 130/80 Code
: 8480-6                                                          BMI: 31.9 Code
: 62520-4                                                          Heart Rate 1
: 78 bpm                                                          Height: 5'3" 
                                                         SpO2: 96%             
                                             Weight: 180 lbs                   
                

 

 2015                                        Blood Pressure 1: 118/74 Code
: 8480-6                                                          BMI: 31.9 Code
: 66664-1                                                          Heart Rate 1
: 60 bpm                                                          Height: 5'3" 
                                                         SpO2: 98%             
                                             Weight: 180 lbs                   
                

 

 2015                                        Blood Pressure 1: 144/96 Code
: 8480-6                                                          Blood 
Pressure 2: 140/88 Code: 8480-6                                                
          BMI: 31.7 Code: 32523-9                                              
            Heart Rate 1: 72 bpm                                               
           Height: 5'3"                                                        
  Weight: 179 lbs                                   



                                                                               
                                                                               
                                                                      



Functional Status

          No Functional Status data                                            
              



History of Present Illness

                      





 Symptom Name                    Status                    Result              
      Effective Date                    Notes                

 

 dyspnea                    Onset and Resolution                    ongoing    
                2018                    None                

 

 dyspnea                    Alleviating Factors                    rest        
            2018                    None                

 

 dyspnea                    Exacerbating Factors                    exertion   
                 2018                    None                

 

 dyspnea                    Pertinent Findings                    Denies chest 
discomfort                    2018                    None                

 

 dyspnea                    Pertinent Findings                    cough        
            2018                    --improving                

 

 dyspnea                    Pertinent Findings                    Denies 
lightheadedness                    2018                    None          
      

 

 weight gain/obesity                    Location                    globally   
                 2018                    None                

 

 weight gain/obesity                    Quality                    chronic     
               2018                    None                

 

 weight gain/obesity                    Onset and Resolution                    
gradual in onset                    2018                    None         
       

 

 weight gain/obesity                    Onset and Resolution                    
ongoing                    2018                    None                

 

 weight gain/obesity                    Frequency of Episodes                  
  unchanged                    2018                    None              
  

 

 weight gain/obesity                    Diet                    is unchanged   
                 2018                    she states that she eats a 
granola bar for breakfast, eats a sandwich or a hamburger for lunch, and cereal 
for a snack, and granola and yogurt for supper.                

 

 weight gain/obesity                    Pertinent Findings                    
dyspnea                    2018                    None                

 

 gas and bloating                    Onset and Resolution                    
ongoing                    2018                    None                

 

 gas and bloating                    Pertinent Findings                    
Denies abdominal distension                    2018                    
None                

 

 gas and bloating                    Pertinent Findings                    
Denies dyspepsia                    2018                    None         
       

 

 gas and bloating                    Pertinent Findings                    
Denies dyspnea                    2018                    None           
     

 

 gas and bloating                    Pertinent Findings                    
Denies early satiety                    2018                    None     
           

 

 gas and bloating                    Pertinent Findings                    
Denies edema                    2018                    None             
   

 

 gas and bloating                    Pertinent Findings                    
Denies flatulence                    2018                    None        
        

 

 gas and bloating                    Pertinent Findings                    
Denies heartburn                    2018                    None         
       

 

 gas and bloating                    Pertinent Findings                    
Denies lightheadedness                    2018                    None   
             

 

 dyspnea                    Quality                    improving               
     2018                    None                

 

 gas and bloating                    Quality                    improving      
              2018                    None                

 

 dyspnea                    Quality                    intermittent            
        10/16/2017                    None                

 

 dyspnea                    Quality                    shortness of breath     
               10/16/2017                    None                

 

 dyspnea                    Onset and Resolution                    ongoing    
                10/16/2017                    None                

 

 dyspnea                    Alleviating Factors                    rest        
            10/16/2017                    None                

 

 dyspnea                    Exacerbating Factors                    exertion   
                 10/16/2017                    None                

 

 dyspnea                    Pertinent Findings                    Denies chest 
discomfort                    10/16/2017                    None                

 

 dyspnea                    Pertinent Findings                    cough        
            10/16/2017                    --improving                

 

 dyspnea                    Pertinent Findings                    Denies 
lightheadedness                    10/16/2017                    None          
      

 

 weight gain/obesity                    Onset and Resolution                    
gradual in onset                    10/16/2017                    None         
       

 

 weight gain/obesity                    Onset and Resolution                    
ongoing                    10/16/2017                    None                

 

 weight gain/obesity                    Frequency of Episodes                  
  unchanged                    10/16/2017                    None              
  

 

 weight gain/obesity                    Pertinent Findings                    
dyspnea                    10/16/2017                    None                

 

 gas and bloating                    Quality                    worsening      
              10/16/2017                    None                

 

 gas and bloating                    Onset and Resolution                    
ongoing                    10/16/2017                    None                

 

 gas and bloating                    Pertinent Findings                    
abdominal distension                    10/16/2017                    None     
           

 

 gas and bloating                    Pertinent Findings                    
Denies dyspepsia                    10/16/2017                    None         
       

 

 gas and bloating                    Pertinent Findings                    
dyspnea                    10/16/2017                    None                

 

 gas and bloating                    Pertinent Findings                    
early satiety                    10/16/2017                    None            
    

 

 gas and bloating                    Pertinent Findings                    
edema                    10/16/2017                    None                

 

 gas and bloating                    Pertinent Findings                    
Denies flatulence                    10/16/2017                    None        
        

 

 gas and bloating                    Pertinent Findings                    
Denies heartburn                    10/16/2017                    None         
       

 

 gas and bloating                    Pertinent Findings                    
Denies lightheadedness                    10/16/2017                    None   
             

 

 weight gain/obesity                    Location                    globally   
                 10/16/2017                    None                

 

 weight gain/obesity                    Quality                    chronic     
               10/16/2017                    None                

 

 weight gain/obesity                    Diet                    is unchanged   
                 10/16/2017                    she states that she eats a 
granola bar for breakfast, eats a sandwich or a hamburger for lunch, and cereal 
for a snack, and granola and yogurt for supper.                

 

 vaccination against influenza                    Location                    
[id number]                    10/16/2017                    None                

 

 dyspnea                    Quality                    intermittent            
        2017                    None                

 

 dyspnea                    Quality                    shortness of breath     
               2017                    None                

 

 dyspnea                    Onset and Resolution                    ongoing    
                2017                    None                

 

 dyspnea                    Alleviating Factors                    rest        
            2017                    None                

 

 dyspnea                    Exacerbating Factors                    exertion   
                 2017                    None                

 

 dyspnea                    Pertinent Findings                    cough        
            2017                    --improving                

 

 weight gain/obesity                    Onset and Resolution                    
gradual in onset                    2017                    None         
       

 

 weight gain/obesity                    Onset and Resolution                    
ongoing                    2017                    None                

 

 weight gain/obesity                    Frequency of Episodes                  
  unchanged                    2017                    None              
  

 

 weight gain/obesity                    Pertinent Findings                    
dyspnea                    2017                    None                

 

 dyspnea                    Pertinent Findings                    Denies chest 
discomfort                    2017                    None                

 

 dyspnea                    Pertinent Findings                    Denies 
lightheadedness                    2017                    None          
      

 

 gas and bloating                    Quality                    worsening      
              2017                    None                

 

 gas and bloating                    Onset and Resolution                    
ongoing                    2017                    None                

 

 gas and bloating                    Pertinent Findings                    
abdominal distension                    2017                    None     
           

 

 gas and bloating                    Pertinent Findings                    
dyspnea                    2017                    None                

 

 gas and bloating                    Pertinent Findings                    
edema                    2017                    None                

 

 gas and bloating                    Pertinent Findings                    
early satiety                    2017                    None            
    

 

 gas and bloating                    Pertinent Findings                    
Denies dyspepsia                    2017                    None         
       

 

 gas and bloating                    Pertinent Findings                    
Denies flatulence                    2017                    None        
        

 

 gas and bloating                    Pertinent Findings                    
Denies heartburn                    2017                    None         
       

 

 gas and bloating                    Pertinent Findings                    
Denies lightheadedness                    2017                    None   
             

 

 vaginal bleeding                    Quality                    acute          
          2017                    None                

 

 vaginal bleeding                    Quality                    red            
        2017                    None                

 

 vaginal bleeding                    Onset and Resolution                    
sudden in onset                    2017                    None          
      

 

 vaginal bleeding                    Onset of Symptom                    1 
weeks ago                    2017                    None                

 

 vaginal bleeding                    Pertinent Findings                    
pelvic pain                    2017                    None              
  

 

 vaginal bleeding                    Severity                    mild          
          2017                    None                

 

 vaginal bleeding                    Limitation on Activities                  
  does not limit activities                    2017                    
None                

 

 vaginal bleeding                    Frequency of Episodes                    
increasing                    2017                    None                

 

 vaginal bleeding                    Triggers                    no known 
associated factors                    2017                    None       
         

 

 abdominal pain                    Location                    diffusely       
             2017                    None                

 

 abdominal pain                    Quality                    acute            
        2017                    None                

 

 abdominal pain                    Onset and Resolution                    
ongoing                    2017                    None                

 

 abdominal pain                    Onset of Symptom                    2 weeks 
ago                    2017                    None                

 

 abdominal pain                    Limitation on Activities                    
does not limit activities                    2017                    None
                

 

 abdominal pain                    Frequency of Episodes                    
increasing                    2017                    None                

 

 abdominal pain                    Triggers                    no known 
associated factors                    2017                    None       
         

 

 dyspnea                    Quality                    intermittent            
        04/10/2017                    None                

 

 dyspnea                    Quality                    shortness of breath     
               04/10/2017                    None                

 

 dyspnea                    Onset and Resolution                    ongoing    
                04/10/2017                    None                

 

 dyspnea                    Alleviating Factors                    rest        
            04/10/2017                    None                

 

 dyspnea                    Exacerbating Factors                    exertion   
                 04/10/2017                    None                

 

 dyspnea                    Pertinent Findings                    cough        
            04/10/2017                    --improving                 

 

 weight gain/obesity                    Onset and Resolution                    
gradual in onset                    04/10/2017                    None         
       

 

 weight gain/obesity                    Onset and Resolution                    
ongoing                    04/10/2017                    None                

 

 weight gain/obesity                    Frequency of Episodes                  
  unchanged                    04/10/2017                    None              
  

 

 weight gain/obesity                    Pertinent Findings                    
dyspnea                    04/10/2017                    None                

 

 hypertension                    Quality                    intermittent       
             04/10/2017                    None                

 

 hypertension                    Onset and Resolution                    
ongoing                    04/10/2017                    None                

 

 hypertension                    Onset of Symptom                    during 
adulthood                    04/10/2017                    None                

 

 hypertension                    Blood Pressure Values                    not 
checking blood pressure at home                    04/10/2017                  
  None                

 

 hypertension                    Alleviating Factors                    
medication                    04/10/2017                    None                

 

 hypertension                    Pertinent Findings                    Denies 
dizziness                    04/10/2017                    None                

 

 hypertension                    Pertinent Findings                    dyspnea 
                   04/10/2017                    None                

 

 hypertension                    Pertinent Findings                    edema   
                 04/10/2017                    in her left leg occasionally    
            

 

 low back pain                    Location                    on both sides    
                04/10/2017                    None                

 

 low back pain                    Radiating                    the left groin  
                  04/10/2017                    (worse)                 

 

 low back pain                    Quality                    acute             
       04/10/2017                    None                

 

 low back pain                    Quality                    intermittent      
              04/10/2017                    None                

 

 low back pain                    Onset and Resolution                    
ongoing                    04/10/2017                    None                

 

 low back pain                    Limitation on Activities                    
allows weight bearing activity                    04/10/2017                    
None                

 

 low back pain                    Exacerbating Factors                    
lifting                    04/10/2017                    None                

 

 low back pain                    Exacerbating Factors                    
motion                    04/10/2017                    None                

 

 low back pain                    Radiating                    the right groin 
                   04/10/2017                    None                

 

 low back pain                    Onset of Symptom                    3 months 
ago                    04/10/2017                    None                

 

 Annual Medicare Wellness Exam                    Alcohol Use                  
  does not drink any alcohol                    2017                    
None                

 

 Annual Medicare Wellness Exam                    Aspirin Use                  
  no                    2017                    None                

 

 Annual Medicare Wellness Exam                    Blood Glucose (self reported)
                    don't know                    2017                    
None                

 

 Annual Medicare Wellness Exam                    Blood Pressure (self reported
)                    borderline (120/80 - 139/89)                    2017
                    None                

 

 Annual Medicare Wellness Exam                    Cholesterol (self reported)  
                  desireable (below 200)                    2017         
           None                

 

 Annual Medicare Wellness Exam                    Depression (last 6 months)   
                 some of the time                    2017                
    None                

 

 Annual Medicare Wellness Exam                    Depression or Hopelessness   
                 almost never                    2017                    
None                

 

 Annual Medicare Wellness Exam                    Describe Your Health         
           very good                    2017                    None     
           

 

 Annual Medicare Wellness Exam                    Exercise Habits              
      exercises 2 days per week                    2017                  
  None                

 

 Annual Medicare Wellness Exam                    Exercise Habits              
      exercises 30 minutes per day                    2017               
     None                

 

 Annual Medicare Wellness Exam                    Handling Stress              
      usually geetha effectively                    2017                  
  None                

 

 Annual Medicare Wellness Exam                    Hemaglobin A-1C (self reported
)                    don't know                    2017                  
  None                

 

 Annual Medicare Wellness Exam                    Hours of Sleep               
     8                    2017                    None                

 

 Annual Medicare Wellness Exam                    Interaction with Friends     
               yes                    2017                    None       
         

 

 Annual Medicare Wellness Exam                    Interests & Pleasure         
           most of the time                    2017                    
None                

 

 Annual Medicare Wellness Exam                    Life Satisfaction            
        satisfied                    2017                    None        
        

 

 Annual Medicare Wellness Exam                    Motor Vehicle Safety         
           always fastens seat belt: y                    2017           
         None                

 

 Annual Medicare Wellness Exam                    Motor Vehicle Safety         
           drives after drinking: n                    2017              
      None                

 

 Annual Medicare Wellness Exam                    Motor Vehicle Safety         
           rides with someone who has been drinking: n                    2017                    None                

 

 Annual Medicare Wellness Exam                    Nutrition                    
servings of fried food / high fat foods per day: 0                    2017                    None                

 

 Annual Medicare Wellness Exam                    Nutrition                    
servings of high fiber / whole grain per day: 2                    2017  
                  None                

 

 Annual Medicare Wellness Exam                    Nutrition                    
servings of vegetables / fruit per day: 1                    2017        
            None                

 

 Annual Medicare Wellness Exam                    Smoking and Tobacco Use      
              non smoker                    2017                    None 
               

 

 Annual Medicare Wellness Exam                    Social & Emotional Support   
                 always                    2017                    None  
              

 

 Annual Medicare Wellness Exam                    Stress                    
almost never                    2017                    None             
   

 

 Annual Medicare Wellness Exam                    Sun Exposure                 
   protects skin when outdoors: y                    2017                
    None                

 

 diarrhea                    Quality                    chronic                
    2017                    Pt states that she has been on lotronex - it 
did not help - she has had colonoscopies and EGD's without improvement in 
symptoms.  She had her gallbladder evaluated - no abnormalities.                

 

 diarrhea                    Onset and Resolution                    ongoing   
                 2017                    None                

 

 diarrhea                    Onset of Symptom                     years ago    
                2017                    since                 

 

 diarrhea                    Timing of Episodes                    after meals 
                   2017                    (mainly lunch)                

 

 dyspnea                    Quality                    intermittent            
        2017                    None                

 

 dyspnea                    Quality                    shortness of breath     
               2017                    None                

 

 dyspnea                    Onset and Resolution                    ongoing    
                2017                    None                

 

 dyspnea                    Alleviating Factors                    rest        
            2017                    None                

 

 dyspnea                    Pertinent Findings                    cough        
            2017                    None                

 

 weight gain/obesity                    Onset and Resolution                    
gradual in onset                    2017                    None         
       

 

 weight gain/obesity                    Onset and Resolution                    
ongoing                    2017                    None                

 

 weight gain/obesity                    Frequency of Episodes                  
  unchanged                    2017                    None              
  

 

 weight gain/obesity                    Pertinent Findings                    
dyspnea                    2017                    None                

 

 hypertension                    Onset and Resolution                    
ongoing                    2017                    None                

 

 hypertension                    Onset of Symptom                    during 
adulthood                    2017                    None                

 

 hypertension                    Blood Pressure Values                    not 
checking blood pressure at home                    2017                  
  None                

 

 hypertension                    Alleviating Factors                    
medication                    2017                    None                

 

 hypertension                    Pertinent Findings                    Denies 
dizziness                    2017                    None                

 

 hypertension                    Pertinent Findings                    dyspnea 
                   2017                    None                

 

 hypertension                    Pertinent Findings                    edema   
                 2017                    in her left leg occasionally    
             

 

 low back pain                    Location                    on both sides    
                2017                    None                

 

 low back pain                    Radiating                    the left groin  
                  2017                    None                

 

 low back pain                    Quality                    intermittent      
              2017                    None                

 

 low back pain                    Quality                    acute             
       2017                    None                

 

 low back pain                    Onset and Resolution                    
ongoing                    2017                    None                

 

 low back pain                    Onset of Symptom                    2 months 
ago                    2017                    None                

 

 low back pain                    Limitation on Activities                    
allows weight bearing activity                    2017                    
None                

 

 low back pain                    Mechanism of injury                    
unknown                    2017                    None                

 

 low back pain                    Exacerbating Factors                    
lifting                    2017                    None                

 

 low back pain                    Exacerbating Factors                    
motion                    2017                    None                

 

 dyspnea                    Exacerbating Factors                    exertion   
                 2017                    None                

 

 hypertension                    Quality                    intermittent       
             2017                    None                

 

 diarrhea                    Quality                    chronic                
    10/03/2016                    Pt states that she has been on lotronex - it 
did not help - she has had colonoscopies and EGD's without improvement in 
symptoms.  She had her gallbladder evaluated - no abnormalities.                

 

 diarrhea                    Onset and Resolution                    ongoing   
                 10/03/2016                    None                

 

 diarrhea                    Onset of Symptom                     years ago    
                10/03/2016                    since                 

 

 diarrhea                    Timing of Episodes                    after meals 
                   10/03/2016                    (mainly lunch)                

 

 cough                    Quality                    dry                    10/
2016                    None                

 

 cough                    Quality                    intermittent              
      10/03/2016                    None                

 

 cough                    Onset and Resolution                    ongoing      
              10/03/2016                    None                

 

 cough                    Pertinent Findings                    dyspnea        
            10/03/2016                    None                

 

 dyspnea                    Quality                    intermittent            
        10/03/2016                    None                

 

 dyspnea                    Quality                    shortness of breath     
               10/03/2016                    None                

 

 dyspnea                    Onset and Resolution                    ongoing    
                10/03/2016                    None                

 

 dyspnea                    Alleviating Factors                    rest        
            10/03/2016                    None                

 

 dyspnea                    Pertinent Findings                    cough        
            10/03/2016                    (improved)                

 

 weight gain/obesity                    Onset and Resolution                    
gradual in onset                    10/03/2016                    None         
       

 

 weight gain/obesity                    Onset and Resolution                    
ongoing                    10/03/2016                    None                

 

 weight gain/obesity                    Frequency of Episodes                  
  unchanged                    10/03/2016                    None              
  

 

 weight gain/obesity                    Pertinent Findings                    
dyspnea                    10/03/2016                    None                

 

 hoarseness                    Quality                    intermittent         
           10/03/2016                    None                

 

 hoarseness                    Quality                    reduced ability to 
maintain tone                    10/03/2016                    None            
    

 

 hoarseness                    Quality                    reduced volume       
             10/03/2016                    None                

 

 hoarseness                    Quality                    breathy voice        
            10/03/2016                    None                

 

 hoarseness                    Onset and Resolution                    ongoing 
                   10/03/2016                    None                

 

 hoarseness                    Limitation on Activities                    
moderately limits communication                    10/03/2016                  
  None                

 

 hoarseness                    Triggers                    no known associated 
factors                    10/03/2016                    None                

 

 hoarseness                    Pertinent Findings                    cough     
               10/03/2016                    None                

 

 cough                    Quality                    improving                 
   10/03/2016                    None                

 

 dyspnea                    Frequency of Episodes                    unchanged 
                   10/03/2016                    None                

 

 hoarseness                    Quality                    improving            
        10/03/2016                    None                

 

 hoarseness                    Frequency of Episodes                    
decreasing                    10/03/2016                    None                

 

 diarrhea                    Quality                    chronic                
    2016                    Pt states that she has been on lotronex - it 
did not help - she has had colonoscopies and EGD's without improvement in 
symptoms.  She had her gallbladder evaluated - no abnormalities.                

 

 diarrhea                    Onset and Resolution                    ongoing   
                 2016                    None                

 

 diarrhea                    Onset of Symptom                     years ago    
                2016                    since                 

 

 cough                    Quality                    intermittent              
      2016                    None                

 

 cough                    Onset and Resolution                    ongoing      
              2016                    None                

 

 dyspnea                    Quality                    shortness of breath     
               2016                    None                

 

 dyspnea                    Onset and Resolution                    ongoing    
                2016                    None                

 

 dyspnea                    Alleviating Factors                    rest        
            2016                    None                

 

 dyspnea                    Pertinent Findings                    cough        
            2016                    None                

 

 weight gain/obesity                    Pertinent Findings                    
dyspnea                    2016                    None                

 

 diarrhea                    Timing of Episodes                    after meals 
                   2016                    None                

 

 cough                    Quality                    dry                                        None                

 

 cough                    Pertinent Findings                    dyspnea        
            2016                    None                

 

 dyspnea                    Quality                    intermittent            
        2016                    None                

 

 weight gain/obesity                    Onset and Resolution                    
gradual in onset                    2016                    None         
       

 

 weight gain/obesity                    Onset and Resolution                    
ongoing                    2016                    None                

 

 weight gain/obesity                    Frequency of Episodes                  
  unchanged                    2016                    None              
  

 

 hoarseness                    Quality                    intermittent         
           2016                    None                

 

 hoarseness                    Quality                    reduced ability to 
maintain tone                    2016                    None            
    

 

 hoarseness                    Quality                    breathy voice        
            2016                    None                

 

 hoarseness                    Quality                    reduced volume       
             2016                    None                

 

 hoarseness                    Onset and Resolution                    ongoing 
                   2016                    None                

 

 hoarseness                    Frequency of Episodes                    daily  
                  2016                    None                

 

 hoarseness                    Limitation on Activities                    
moderately limits communication                    2016                  
  None                

 

 hoarseness                    Triggers                    no known associated 
factors                    2016                    None                

 

 hoarseness                    Pertinent Findings                    cough     
               2016                    None                

 

 headache                    Quality                    aching                 
   2016                    None                

 

 headache                    Quality                    dull                    
2016                    None                

 

 headache                    Onset and Resolution                    sudden in 
onset                    2016                    None                

 

 headache                    Onset of Symptom                    1 weeks ago   
                 2016                    None                

 

 diarrhea                    Quality                    chronic                
    2016                    Pt states that she has been on lotronex - it 
did not help - she has had colonoscopies and EGD's without improvement in 
symptoms.  She had her gallbladder evaluated - no abnormalities.                

 

 diarrhea                    Onset and Resolution                    ongoing   
                 2016                    None                

 

 diarrhea                    Onset of Symptom                    _ years ago   
                 2016                    since                 

 

 cough                    Quality                    acute                                        None                

 

 cough                    Quality                    intermittent              
      2016                    None                

 

 cough                    Quality                    dry                    2016                    None                

 

 cough                    Onset and Resolution                    sudden in 
onset                    2016                    None                

 

 cough                    Onset and Resolution                    ongoing      
              2016                    None                

 

 cough                    Onset of Symptom                    1 months ago     
               2016                    None                

 

 cough                    Pertinent Findings                    Denies fever   
                 2016                    None                

 

 cough                    Pertinent Findings                    Denies chills  
                  2016                    None                

 

 cough                    Pertinent Findings                    Denies chest 
discomfort                    2016                    None                

 

 cough                    Pertinent Findings                    Denies 
heartburn                    2016                    None                

 

 dyspnea                    Quality                    acute                    
2016                    None                

 

 dyspnea                    Quality                    shortness of breath     
               2016                    None                

 

 dyspnea                    Onset and Resolution                    sudden in 
onset                    2016                    None                

 

 dyspnea                    Onset and Resolution                    ongoing    
                2016                    None                

 

 dyspnea                    Onset of Symptom                    1 months ago   
                 2016                    None                

 

 dyspnea                    Alleviating Factors                    rest        
            2016                    None                

 

 dyspnea                    Pertinent Findings                    cough        
            2016                    None                

 

 weight gain/obesity                    Quality                    acute       
             2016                    None                

 

 weight gain/obesity                    Onset and Resolution                    
sudden in onset                    2016                    None          
      

 

 weight gain/obesity                    Onset of Symptom                    1 
months ago                    2016                    None                

 

 weight gain/obesity                    Pertinent Findings                    
Denies heartburn                    2016                    None         
       

 

 weight gain/obesity                    Pertinent Findings                    
dyspnea                    2016                    None                

 

 weight gain/obesity                    Pertinent Findings                    
Denies edema                    2016                    None             
   

 

 weight gain/obesity                    Pertinent Findings                    
Denies nausea                    2016                    None            
    

 

 hypertension                    Onset and Resolution                    
ongoing                    2016                    None                

 

 hypertension                    Alleviating Factors                    
medication                    2016                    None                

 

 hypertension                    Blood Pressure Values                    not 
checking blood pressure at home                    2016                  
  None                

 

 hypertension                    Pertinent Findings                    
dizziness                    2016                    when she stands up 
too fast                 

 

 hypertension                    Pertinent Findings                    dyspnea 
                   2016                    None                

 

 hypertension                    Pertinent Findings                    edema   
                 2016                    in her feet some                

 

 hypertension                    Quality                    stable             
       2016                    None                

 

 diarrhea                    Quality                    chronic                
    2016                    Pt states that she has been on lotronex - it 
did not help - she has had colonoscopies and EGD's without improvement in 
symptoms.  She had her gallbladder evaluated - no abnormalities.                

 

 diarrhea                    Onset and Resolution                    ongoing   
                 2016                    None                

 

 diarrhea                    Onset of Symptom                    _ years ago   
                 2016                    since                 

 

 ~generic                    Location                    diffusely             
       2015                    Feels hot all of the time                

 

 ~generic                    Onset of Symptom                    1 years ago   
                 2015                     - she started noticing problems 
about 1-2 years ago -                 

 

 hoarseness                    Quality                    intermittent         
           2015                    comes and goes                

 

 hoarseness                    Onset of Symptom                    1+ years ago
                    2015                    None                

 

 insomnia                    Quality                    difficulty falling 
asleep                    2015                    None                

 

 insomnia                    Onset of Symptom                    1 years ago   
                 2015                    since                
  

 

 back pain                    Location                    in the left lower 
back area                    2015                    None                

 

 back pain                    Location                    in the right lower 
back area                    2015                    None                

 

 back pain                    Onset of Symptom                    1 months ago 
                   2015                    None                

 

 back pain                    Frequency of Episodes                    
decreasing                    2015                    first two weeks had 
trouble walking- had lots of pain- couldnt turn or bend down.                

 

 back pain                    Mechanism of injury                    unknown   
                 2015                    None                

 

 rash                    Quality                    intermittent               
     2015                    None                

 

 rash                    Onset and Resolution                    gradual in 
onset                    2015                    None                

 

 rash                    Onset of Symptom                    6 months ago      
              2015                    fall                

 

 rash                    Severity                    moderate                  
  2015                    None                

 

 rash                    Triggers                    no known triggers         
           2015                    None                

 

 rash                    Pertinent Findings                    Denies fever    
                2015                    None                

 

 rash                    Pertinent Findings                    Denies itching  
                  2015                    None                

 

 rash                    Pertinent Findings                    Denies pain     
               2015                    None                

 

 shortness of breath                    Quality                    acute       
             2015                    None                

 

 shortness of breath                    Onset and Resolution                    
gradual in onset                    2015                    None         
       

 

 shortness of breath                    Onset of Symptom                    _ 
weeks ago                    2015                    since it is warm she 
has been walking                

 

 shortness of breath                    Pertinent Findings                    
Denies chest discomfort                    2015                    None  
              

 

 rash                    Location-Head/Neck                    on the left 
cheek                    2015                    None                

 

 rash                    Onset of Symptom                    6 months ago      
              2015                    fall                

 

 rash                    Pertinent Findings                    Denies fever    
                2015                    None                

 

 rash                    Pertinent Findings                    Denies pain     
               2015                    None                

 

 rash                    Pertinent Findings                    Denies itching  
                  2015                    None                

 

 shortness of breath                    Onset of Symptom                    _ 
weeks ago                    2015                    since it is warm she 
has been walking                 

 

 shortness of breath                    Pertinent Findings                    
Denies chest discomfort                    2015                    None  
              

 

 shortness of breath                    Quality                    acute       
             2015                    None                

 

 shortness of breath                    Onset and Resolution                    
gradual in onset                    2015                    None         
       

 

 rash                    Color                    erythematous                 
   2015                    None                

 

 rash                    Quality                    intermittent               
     2015                    None                

 

 rash                    Onset and Resolution                    gradual in 
onset                    2015                    None                

 

 rash                    Severity                    moderate                  
  2015                    None                

 

 rash                    Prior Treatments                    unresponsive to 
treatment                    2015                    None                

 

 rash                    Triggers                    no known triggers         
           2015                    None                



                                                                    



Advance Directives

          No Advance Directive data                                            
                        



Encounters

                      





 Encounter                    Performer                    Location            
        Codes                    Date                

 

                     [id number]) [id number] EST. PATIENT, LEVEL III

Diagnosis: Essential (primary) hypertension[ICD10: I10]

Diagnosis: Discoid lupus erythematosus[ICD10: L93.0]                           
           Madai Anderson MD, [facility]            
        CPT-4: 88762                    2018                

 

                     [phone]) [id number] EST. PATIENT, LEVEL IV

Diagnosis: Essential (primary) hypertension[ICD10: I10]

Diagnosis: Other forms of dyspnea[ICD10: R06.09]

Diagnosis: Encounter for immunization[ICD10: Z23]

Diagnosis: Mixed hyperlipidemia[ICD10: E78.2]

Diagnosis: Morbid (severe) obesity due to excess calories[ICD10: E66.01]       
                               Madai Anderson MD, [facility]                    CPT-4: 81033                    10/16/2017          
      

 

                     [id number]) [id number] EST. PATIENT, LEVEL IV

Diagnosis: Essential (primary) hypertension[ICD10: I10]

Diagnosis: Other organ or system involvement in systemic lupus erythematosus[
ICD10: M32.19]

Diagnosis: Low back pain[ICD10: M54.5]

Diagnosis: Generalized abdominal pain[ICD10: R10.84]

Diagnosis: Other forms of dyspnea[ICD10: R06.09]                               
       Madai Anderson MD, [facility]                
    CPT-4: 92622                    2017                

 

                     [phone]) [id number] EST. PATIENT, LEVEL IV

Diagnosis: Abnormal uterine and vaginal bleeding, unspecified[ICD10: N93.9]

Diagnosis: Generalized abdominal pain[ICD10: R10.84]                           
           Carmen Anderson MD, [facility]          
          CPT-4: 88543                    2017                

 

                     [id number]) 55689 EST. PATIENT, LEVEL IV

Diagnosis: Pain in left hip[ICD10: M25.552]

Diagnosis: Low back pain[ICD10: M54.5]

Diagnosis: Essential (primary) hypertension[ICD10: I10]

Diagnosis: Mixed hyperlipidemia[ICD10: E78.2]                                  
    [facility]                    
CPT-4: 99613                    04/10/2017                

 

                     [phone]) 93565 EST. PATIENT, LEVEL IV

Diagnosis: Essential (primary) hypertension[ICD10: I10]

Diagnosis: Other organ or system involvement in systemic lupus erythematosus[
ICD10: M32.19]                                      Madai Anderson MD, [facility]                    CPT-4: 90492                  
  2017                

 

                     ([id number]) 73788 EST. PATIENT, LEVEL IV

Diagnosis: Essential (primary) hypertension[ICD10: I10]

Diagnosis: Encounter for immunization[ICD10: Z23]

Diagnosis: Discoid lupus erythematosus[ICD10: L93.0]                           
           Madai Anderson MD, [facility]            
        CPT-4: 23090                    10/03/2016                

 

                     [id number]) 59063 EST. PATIENT, LEVEL IV

Diagnosis: Essential (primary) hypertension[ICD10: I10]

Diagnosis: Other intestinal malabsorption[ICD10: K90.89]

Diagnosis: Dysphonia[ICD10: R49.0]

Diagnosis: Encounter for immunization[ICD10: Z23]                              
        Madai Anderson MD, [facility]               
     CPT-4: 47808                    2016                

 

                     [id number]) [id number] EST. PATIENT, LEVEL IV

Diagnosis: Essential (primary) hypertension[ICD10: I10]

Diagnosis: Systemic lupus erythematosus, unspecified[ICD10: M32.9]

Diagnosis: Other fecal abnormalities[ICD10: R19.5]                             
         Madai Anderson MD, [facility]              
      CPT-4: 23726                    2016                

 

                     [id number]) [id number] EST. PATIENT, LEVEL IV

Diagnosis: Essential (primary) hypertension[ICD10: I10]

Diagnosis: Systemic lupus erythematosus, unspecified[ICD10: M32.9]

Diagnosis: Intestinal malabsorption, unspecified[ICD10: K90.9]                 
                     Madai Anderson MD, [facility]  
                  CPT-4: 04466                    2016                

 

                     [id number]) [id number] EST. PATIENT, LEVEL IV

Diagnosis: ESSENTIAL HYPERTENSION[ICD9: 401.9]

Diagnosis: Medication reaction[ICD9: 995.20]                                   
   Madai Anderson MD, [facility]                    
CPT-4: 81986                    2015                

 

                     ([id number]) 80466 EST. PATIENT, LEVEL III

Diagnosis: Low back pain[ICD9: 724.2]

Diagnosis: Sacroiliitis[ICD9: 720.2]                                      
Carmen Anderson MD, [facility]                    
CPT-4: 60488                    2015                

 

                     [id number]) 77983 EST. PATIENT, LEVEL IV

Diagnosis: Malar rash[ICD9: 782.1]

Diagnosis: ESOPHAGEAL REFLUX[ICD9: 530.81]

Diagnosis: ESSENTIAL HYPERTENSION[ICD9: 401.9]                                 
     [facility]                  
  CPT-4: 72313                    2015                

 

                     ([id number]) OFFICE  VISIT, NEW - LEVEL 4

Diagnosis: ESSENTIAL HYPERTENSION[ICD9: 401.9]

Diagnosis: ESOPHAGEAL REFLUX[ICD9: 530.81]

Diagnosis: Rosacea[ICD9: 695.3]

Diagnosis: Malar rash[ICD9: 782.1]                                      [facility]                    CPT-4: 
29503                    2015                



                                                                               
                                                                               
                                                                               
           



Plan of Care

                      





 Planned Activity                    Notes                    Codes            
        Status                    Date                

 

 Visit Plan:                     Hypertension - well controlled - continue with 
current medications, continue with no added salt diet. Pt has been encouraged 
to exercise daily. The pt has been advised to call the office if there are any 
acute concerns about change in blood pressure readings at home. Lupus - 
continue with plaquenil

                                                             2018        
        

 

 Patient Education: Patient Medication Summary                                 
                            Completed                    2018            
    

 

 Visit Plan:                     Hypertension - well controlled - continue with 
current medications, continue with no added salt diet. Pt has been encouraged 
to exercise daily. The pt has been advised to call the office if there are any 
acute concerns about change in blood pressure readings at home. Hyperlipidemia 
- pt has been counseled about appropriate diet, exercise, and need for low fat 
food choices. I have discussed the need for the patient to take medications as 
prescribed. If the patient has negative side effects from the medication, they 
are to CALL the office and not abruptly discontinue the medication without 
discussion with a practitioner in the office. We will check labs in 3-6 months 
for follow up on the patient's chronic medical problem and to assure normal 
liver response to medications. Weight gain - discussed with patient that she 
needs to decrease her carbohydrate intake, increase activity monitor symptoms.

                                                             10/16/2017        
        

 

 Appointment: Madai Anderson 

WPtel:[phone]

 [address] [place], [place] [address]

                                                             (15 min) 
Moderate                    10/16/2017                

 

 Patient Education: Patient Medication Summary                                 
                            Completed                    10/16/2017            
    

 

 Patient Education: Obesity                                                    
         Completed                    10/16/2017                

 

 Visit Plan:                     Hypertension - well controlled - continue with 
current medications, continue with no added salt diet. Pt has been encouraged 
to exercise daily. The pt has been advised to call the office if there are any 
acute concerns about change in blood pressure readings at home. Abdominal pain 
- I have recommended referral to [facility] for egd/colnoscopy - however she has 
refused the recommendation - she wants to wait for the next three months to see 
if her gi symptoms resolve. Lupus - continue with plaquenil. Dyspnea - 
recommended pt to prop herself up at night when sleeping to see if this helps 
to decrease her shortness of breath.

                                                             2017        
        

 

 Appointment: Madai Anderosn 

WPtel:[phone]

 [address] [facility]

[place]                                                             (15 min) 
Moderate                    2017                

 

 Patient Education: Patient Medication Summary                                 
                            Completed                    2017            
    

 

 Patient Education: Obesity                                                    
         Completed                    2017                

 

 Visit Plan:                     Vaginal bleeding-pap completed today in the 
office-refer to Dr Sandoval for evaluation Abdominal pain-bloating-schedule CT scan 
of abd/pelvis

                                                             2017        
        

 

 Appointment: Carmen Farris 

WPtel:[phone]

 [address] [facility]

[place]                                                             (15 min) 
Moderate                    2017                

 

 Patient Education: Patient Medication Summary                                 
                            Completed                    2017            
    

 

 Patient Education: Obesity                                                    
         Completed                    2017                

 

 Care Plan: PAP                                                             
Pending                    2017                

 

 Care Plan: Referral Order                                        SNOMED-CT : 
[id number]

                    Pending                    2017                

 

 Visit Plan:                     Hypertension - well controlled - continue with 
current medications, continue with no added salt diet. Pt has been encouraged 
to exercise daily. The pt has been advised to call the office if there are any 
acute concerns about change in blood pressure readings at home. Lupus - 
symptoms controlled. Hyperlipidemia - pt has been counseled about appropriate 
diet, exercise, and need for low fat food choices. I have discussed the need 
for the patient to take medications as prescribed. If the patient has negative 
side effects from the medication, they are to CALL the office and not abruptly 
discontinue the medication without discussion with a practitioner in the 
office. We will check labs in 3-6 months for follow up on the patient's chronic 
medical problem and to assure normal liver response to medications. Hip pain- 
stretching

                                                             04/10/2017        
        

 

 Appointment: Madai Anderson 

WPtel:[phone]

 [address] [facility]

                                                             (15 min) 
Moderate                    04/10/2017                

 

 Patient Education: Patient Medication Summary                                 
                            Completed                    04/10/2017            
    

 

 Patient Education: Obesity                                                    
         Completed                    04/10/2017                

 

 Visit Plan:                     Medicare Exam - today we discussed the 
patients past history, immunizations, preventative exams/evaluations - 
colonoscopy, fecal occult blood testing, routine labs for renal function, 
glucose, cholesterol, osteoporosis evaluations, cardiovascular testing and 
cancer screenings. We have also discussed mental health and the signs/symptoms 
of depression. The patient was advised of home safety evaluations and the need 
to make sure that as the aging process continues, we need to be aware of 
different ways to make the home a safer place to reside. The patient has also 
been counseled that exercise is necessary - and of utmost importance as we age 
to help decrease fall risk and to maintain independece in the home. Today we 
discussed the need for the patient to create paperwork for Advanced directives 
as well as for the patient to provide this office with a copy of her DOPA 
paperwork for health care surrogate.

                                                             2017        
        

 

 Appointment: Kenyetta Chambers 

WPtel:[phone]

 [address] [facility]

[facility] - Annual 
Wellness Visit                    2017                

 

 Patient Education: Patient Medication Summary                                 
                            Completed                    2017            
    

 

 Visit Plan:                     Hypertension - uncontrolled - the patient's 
medications have been modified as documented in the visit note. The patient has 
been counseled to cut back on salt in diet for a no added salt diet, low fat 
diet, start an exercise program with low weight bearing exercises and higher 
aerobic activity for heart health. The patient is to check blood pressure 
readings as an outpatient and either fax, call, or email the readings to the 
office next week for practitioner to review. The pt is to call for acute 
concerns. Lupus - symptoms controlled.

                                                             2017        
        

 

 Appointment: Madai Anderson 

WPtel:[phone]

 [address] [facility]

                                                             (15 min) 
Moderate                    2017                

 

 Patient Education: Patient Medication Summary                                 
                            Completed                    2017            
    

 

 Patient Education: Obesity                                                    
         Completed                    2017                

 

 Care Plan: Comp Metabolic                                                     
        Pending                    2017                

 

 Care Plan: Cbc With Differential                                              
               Pending                    2017                

 

 Care Plan: Tsh                                                             
Pending                    2017                

 

 Care Plan: Lipid                                                             
Pending                    2017                

 

 Patient Education: Patient Medication Summary                                 
                            Completed                    2017            
    

 

 Visit Plan:                     Hypertension - well controlled - continue with 
current medications, continue with no added salt diet. Pt has been encouraged 
to exercise daily. The pt has been advised to call the office if there are any 
acute concerns about change in blood pressure readings at home. prevnar shot 
Lupus - continue with plaquenil

                                                             10/03/2016        
        

 

 Appointment: Madai Anderson 

WPtel:[phone]

 [address] [facility]                                                             (15 min) 
Moderate                    10/03/2016                

 

 Patient Education: Patient Medication Summary                                 
                            Completed                    10/03/2016            
    

 

 Patient Education: Obesity                                                    
         Completed                    10/03/2016                

 

 Patient Education: Hypertension                                               
              Completed                    10/03/2016                

 

 Visit Plan:                     Hypertension - too well controlled - stop 
lisinopril - continue with no added salt diet. Pt has been encouraged to 
exercise daily. The pt has been advised to call the office if there are any 
acute concerns about change in blood pressure readings at home. Depression - 
start sertraline 50mg daily. Dysphonia - may improve with stopping lisinopril. 
monitor symptoms. Diarrhea - start bentyl

                                                             2016        
        

 

 Appointment: Madai Anderson 

WPtel:[phone]

 [address] [facility]

                                                             (15 min) 
Moderate                    2016                

 

 Patient Education: Patient Medication Summary                                 
                            Completed                    2016            
    

 

 Patient Education: Obesity                                                    
         Completed                    2016                

 

 Patient Education: Hypertension                                               
              Completed                    2016                

 

 Visit Plan:                     Hypertension - well controlled - continue with 
current medications, continue with no added salt diet. Pt has been encouraged 
to exercise daily. The pt has been advised to call the office if there are any 
acute concerns about change in blood pressure readings at home. Diarrhea - 
associated with meals - recommended questran Lupus - continue with plaquenil.

                                                             2016        
        

 

 Patient Education: Patient Medication Summary                                 
                            Completed                    2016            
    

 

 Patient Education: Hypertension                                               
              Completed                    2016                

 

 Visit Plan:                     Hypertension - well controlled - continue with 
current medications, continue with no added salt diet. Pt has been encouraged 
to exercise daily. The pt has been advised to call the office if there are any 
acute concerns about change in blood pressure readings at home. Lupus - pt 
using plaquenil - continue with current use. Diarrhea - Pt instructed as follows
: PROBIOTICS - align, culturelle, or [facility] [employer]. - start with three 
times a day x 2 weeks then decrease to twice daily x 2 weeks then daily 
thereafter. - if this does not help the gi tract symptoms, then call the office 
and we can give a script for a medication called questran.

                                                             2016        
        

 

 Appointment: Madai Anderson 

WPtel:[phone]

 [address] [facility]

                                                             (15 min) 
Moderate                    2016                

 

 Patient Education: Patient Medication Summary                                 
                            Completed                    2016            
    

 

 Patient Education: Hypertension                                               
              Completed                    2016                

 

 Visit Plan:                     Hypertension - well controlled - continue with 
current medications, continue with no added salt diet. Pt has been encouraged 
to exercise daily. The pt has been advised to call the office if there are any 
acute concerns about change in blood pressure readings at home. Cold Sweats - 
discussed pt's medication - isosorbide may be part of the cause of the cold 
sweats.

                                                             2015        
        

 

 Appointment: Madai Anderson 

WPtel:[phone]

 [address] [facility]

                                                             Follow up       
             2015                

 

 Patient Education: Patient Medication Summary                                 
                            Completed                    2015            
    

 

 Patient Education: Hypertension                                               
              Completed                    2015                

 

 Visit Plan:                     Low back pain- the patient was instructed in 
appropriate posture, need for weight loss to alleviate abdominal obesity that 
is worsening the patient's back pain.. The pt is to use prn antiinflammatories 
to manage acute pain. The patient is to call the office if the pain is 
worsening or does not improve. Sacroiliitis - back exercises discussed with the 
patient, pt to continue with anti-inflammatories. Pt is to call if the symptoms 
do not improve or if they worsen.

                                                             2015        
        

 

 Appointment:                                                              Sick
                    2015                

 

 Patient Education: Patient Medication Summary                                 
                            Completed                    2015            
    

 

 Visit Plan:                     Esophageal Reflux - the patient has been 
counseled against excessive intake of caffeine, spicy foods, peppermint, and 
cinnamon - all of which can exacerbate esophageal reflux. The patient is to 
take medications as prescribed and call the office if the symptoms are not 
improving. Hypertension - well controlled - continue with current medications, 
continue with no added salt diet. Pt has been encouraged to exercise daily. The 
pt has been advised to call the office if there are any acute concerns about 
change in blood pressure readings at home. Rash - improving - pt with + ADITI 1:
320 - pt does not want to drive to [place] to be seen by a Rheumatologist - 
we will therefore attempt to get the pt an appt with a local Rheumatologist/
Immuniologist.

                                                             2015        
        

 

 [place]: [place] 

WPtel:[phone]

 [facility]

                                                             Follow up       
             2015                

 

 Patient Education: Patient Medication Summary                                 
                            Completed                    2015            
    

 

 Patient Education: Hypertension                                               
              Completed                    2015                

 

 Visit Plan:                     Hypertension - well controlled - continue with 
current medications, continue with no added salt diet. Pt has been encouraged 
to exercise daily. The pt has been advised to call the office if there are any 
acute concerns about change in blood pressure readings at home. Esophageal 
Reflux - the patient has been counseled against excessive intake of caffeine, 
spicy foods, peppermint, and cinnamon - all of which can exacerbate esophageal 
reflux. The patient is to take medications as prescribed and call the office if 
the symptoms are not improving. Rash across cheeks - pt to start on 
metronidazole gel, pt to have labs to see if this rash is due to lupus - due to 
the malar appearance of the rash - however, we will also start her on the metro 
gel as the rash does have some appearance of rosacea.

                                                             2015        
        

 

 Appointment: Madai Anderson 

WPtel:[phone]

 [address] [id number]

                                                             New Patient     
               2015                

 

 Patient Education: Patient Medication Summary                                 
                            Completed                    2015            
    

 

 Patient Education: Hypertension                                               
              Completed                    2015                

 

 Referral: Jay Goodman 

WPtel:[phone]                    Referral                              
           Appointment Requested                                    



                                                                               
                                                                               
                                                                               
                                                                               
                                                                               
                                                                               
                                                                               
                                             



Instructions

                      





 Comment                

 

                     gas- ex over the counter -take this twice daily - morning 
and evening.

 . Hypertension - well controlled - continue with current medications, continue 
with no added salt diet.  Pt has been encouraged to exercise daily.

The pt has been advised to call the office if there are any acute concerns 
about change in blood pressure readings at home.



Hyperlipidemia -  pt has been counseled about appropriate diet, exercise, and 
need for low fat food choices.  I have discussed the need for the patient to 
take medications as prescribed. If the patient has negative side effects from 
the medication, they are to CALL the office and not abruptly discontinue the 
medication without discussion with a practitioner in the office.  We will check 
labs in 3-6 months for follow up on the patient's chronic medical problem and 
to assure normal liver response to medications.



Weight gain - discussed with patient that she needs to decrease her 
carbohydrate intake, increase activity  monitor symptoms.

                                  

 

                     Pap



CT abd/pelvis 



UA 



labs . Vaginal bleeding-pap completed today in the office-refer to Dr Sandoval for 
evaluation 



Abdominal pain-bloating-schedule CT scan of abd/pelvis                         
          

 

                     melatonin 3mg to 10mg daily at bedtime for help with sleep

 . Hypertension - well controlled - continue with current medications, continue 
with no added salt diet.  Pt has been encouraged to exercise daily.

The pt has been advised to call the office if there are any acute concerns 
about change in blood pressure readings at home.



Cold Sweats - discussed pt's  medication - isosorbide may be part of the cause 
of the cold sweats.

                                  

 

                     . Hypertension - well controlled - continue with current 
medications, continue with no added salt diet.  Pt has been encouraged to 
exercise daily.

The pt has been advised to call the office if there are any acute concerns 
about change in blood pressure readings at home.





Lupus - continue with plaquenil                                  

 

                     PROBIOTICS - align, culturelle, or [employer] health. 
- start with three times a day x 2 weeks then decrease to twice daily x 2 weeks 
then daily thereafter. - if this does not help the gi tract symptoms, then call 
the office and we can give a script for a medication called questran.

 . Hypertension - well controlled - continue with current medications, continue 
with no added salt diet.  Pt has been encouraged to exercise daily.

The pt has been advised to call the office if there are any acute concerns 
about change in blood pressure readings at home.



Lupus - pt using plaquenil - continue with current use.



Diarrhea - Pt instructed as follows:

PROBIOTICS - align, culturelle, or [employer] health. - start with three 
times a day x 2 weeks then decrease to twice daily x 2 weeks then daily 
thereafter. - if this does not help the gi tract symptoms, then call the office 
and we can give a script for a medication called questran.

                                  

 

                     . Hypertension - well controlled - continue with current 
medications, continue with no added salt diet.  Pt has been encouraged to 
exercise daily.

The pt has been advised to call the office if there are any acute concerns 
about change in blood pressure readings at home.



Lupus - symptoms controlled.



Hyperlipidemia -  pt has been counseled about appropriate diet, exercise, and 
need for low fat food choices.  I have discussed the need for the patient to 
take medications as prescribed. If the patient has negative side effects from 
the medication, they are to CALL the office and not abruptly discontinue the 
medication without discussion with a practitioner in the office.  We will check 
labs in 3-6 months for follow up on the patient's chronic medical problem and 
to assure normal liver response to medications.



Hip pain- stretching                                  

 

                     decrease the isosorbide to one pill daily - call if any 
chest pain - and increase back up to 2 pills daily if you have chest pain or 
exercise intolerance that worsens.

 . Hypertension - well controlled - continue with current medications, continue 
with no added salt diet.  Pt has been encouraged to exercise daily.

The pt has been advised to call the office if there are any acute concerns 
about change in blood pressure readings at home.



prevnar shot



Lupus - continue with plaquenil

                                  

 

                     . Hypertension - uncontrolled - the patient's medications 
have been modified as documented in the visit note.  The patient has been 
counseled to cut back on salt in diet for a no added salt diet, low fat diet, 
start an exercise program with low weight bearing exercises and higher aerobic 
activity for heart health.  

The patient is to check blood pressure readings as an outpatient and either fax
, call, or email the readings to the office next week for practitioner to 
review.

The pt is to call for acute concerns.



Lupus - symptoms controlled.

                                  

 

                     stop lisinopril

check blood pressure and heart rate at home twice daily - bring in report in 2 
weeks

 . Hypertension - too well controlled - stop lisinopril - continue with no 
added salt diet.  Pt has been encouraged to exercise daily.

The pt has been advised to call the office if there are any acute concerns 
about change in blood pressure readings at home.



Depression - start sertraline 50mg daily.



Dysphonia - may improve with stopping lisinopril.  monitor symptoms.



Diarrhea - start bentyl                                  

 

                     . Low back pain- the patient was instructed in appropriate 
posture, need for weight loss to alleviate abdominal obesity that is worsening 
the patient's back pain.. The pt is to use prn antiinflammatories to manage 
acute pain. The patient is to call the office if the pain is worsening or does 
not improve. 



Sacroiliitis - back exercises discussed with the patient, pt to continue with 
anti-inflammatories. Pt is to call if the symptoms do not improve or if they 
worsen.

                                  

 

                     . Hypertension - well controlled - continue with current 
medications, continue with no added salt diet.  Pt has been encouraged to 
exercise daily.

The pt has been advised to call the office if there are any acute concerns 
about change in blood pressure readings at home.





Esophageal Reflux - the patient has been counseled against excessive intake of 
caffeine, spicy foods, peppermint, and cinnamon - all of which can exacerbate 
esophageal reflux.

The patient is to take medications as prescribed and call the office if the 
symptoms are not improving.



Rash across cheeks - pt to start on metronidazole gel, pt to have labs to see 
if this rash is due to lupus - due to the malar appearance of the rash - however
, we will also start her on the metro gel as the rash does have some appearance 
of rosacea.

                                  

 

                     . Medicare Exam - today we discussed the patients past 
history, immunizations, preventative exams/evaluations - colonoscopy, fecal 
occult blood testing, routine labs for renal function, glucose, cholesterol,  
osteoporosis evaluations, cardiovascular testing and cancer screenings.  We 
have also discussed mental health and the signs/symptoms of depression.  The 
patient was advised of home safety evaluations and the need to make sure that 
as the aging process continues, we need to be aware of different ways to make 
the home a safer place to reside.  The patient has also been counseled that 
exercise is necessary - and of utmost importance as we age to help decrease 
fall risk and to maintain independece in the home.

Today we discussed the need for the patient to create paperwork for [employer] 
directives as well as for the patient to provide this office with a copy of her 
[employer] paperwork for health care surrogate.

                                  

 

                     . Hypertension - well controlled - continue with current 
medications, continue with no added salt diet.  Pt has been encouraged to 
exercise daily.

The pt has been advised to call the office if there are any acute concerns 
about change in blood pressure readings at home.





 Abdominal pain -  I have recommended referral to [facility] for egd/colnoscopy - 
however she has refused the recommendation - she wants to wait for the next 
three months to see if her gi symptoms resolve.





Lupus - continue with plaquenil.



Dyspnea - recommended pt to prop herself up at night when sleeping to see if 
this helps to decrease her shortness of breath.                                
  

 

                     . Hypertension - well controlled - continue with current 
medications, continue with no added salt diet.  Pt has been encouraged to 
exercise daily.

The pt has been advised to call the office if there are any acute concerns 
about change in blood pressure readings at home.



Diarrhea - associated with meals - recommended questran



Lupus - continue with plaquenil.                                  

 

                     . Esophageal Reflux - the patient has been counseled 
against excessive intake of caffeine, spicy foods, peppermint, and cinnamon - 
all of which can exacerbate esophageal reflux.

The patient is to take medications as prescribed and call the office if the 
symptoms are not improving.



Hypertension - well controlled - continue with current medications, continue 
with no added salt diet.  Pt has been encouraged to exercise daily.

The pt has been advised to call the office if there are any acute concerns 
about change in blood pressure readings at home.



Rash - improving - pt with + ADITI 1:320 - pt does not want to drive to  [place] to be seen by a Rheumatologist - we will therefore attempt to get the pt 
an appt with a local Rheumatologist/Immuniologist.

## 2018-03-11 NOTE — DIAGNOSTIC IMAGING REPORT
INDICATION: Cough.



COMPARISON: 10/15/2008



TECHNIQUE: Single frontal radiograph of the chest dated

03/11/2018.



FINDINGS: The cardiac silhouette is within normal limits. No

significant pulmonary vascular congestion. The lungs are clear.

No pleural effusion. No pneumothorax. No acute osseous

abnormality.



IMPRESSION: No acute cardiopulmonary abnormality.



Dictated by: 



  Dictated on workstation # [id number]

## 2018-03-11 NOTE — ED COUGH/URI
General


Chief Complaint:  Cough/Cold/Flu Symptoms


Stated Complaint:  SORE THROAT HEADACHE BODY ACHES ALL OVER


Nursing Triage Note:  


PT C/O SORE THROAT, BODY ACHES, HA, CHILLS, AND MALAISE SINCE THURSDAY EVENING.


Source:  patient


Exam Limitations:  no limitations





History of Present Illness


Date Seen by Provider:  Mar 11, 2018


Time Seen by Provider:  08:28


Initial Comments


The patient is a 72-year-old white female.  She reports that since Thursday 

evening she has had a significant sore throat, a headache, and aching all over 

her body.  Temperature at presentation was 99.5.  She was especially concerned 

because she has lupus.  She denied chills or sweats.


Timing/Duration:  week


Severity/Quality:  no cough


Prior Episodes/Possible Cause:  occasional episodes





Allergies and Home Medications


Allergies


Coded Allergies:  


     No Known Drug Allergies (Unverified , 7/4/12)





Home Medications


Atenolol 50 Mg Tablet, 1 EACH PO DAILY, (Reported)


Cyclobenzaprine Hcl 10 Mg Tablet, 1 EACH PO Q8HR PRN


   Prescribed by: BETH BLANCHARD on 7/4/12 0859


Isosorbide Dinitrate 30 Mg Tablet, 1 EACH PO BID, (Reported)


Lisinopril 20 Mg Tablet, 20 MG PO BID, (Reported)


Prednisone 10 Mg Tab, 30 MG PO DAILY


   Prescribed by: BETH BLANCHARD on 7/4/12 0859


Rosuvastatin Calcium 20 Mg Tablet, 1 EACH PO DAILY, (Reported)


Sertraline Hcl 50 Mg Tablet, 50 MG PO DAILY, (Reported)


Zolpidem Tartrate 5 Mg Tablet, 5 MG PO DAILY, (Reported)





Patient Home Medication List


Home Medication List Reviewed:  Yes





Constitutional:  see HPI


EENTM:  nose congestion, throat pain


Respiratory:  no symptoms reported


Cardiovascular:  no symptoms reported


Gastrointestinal:  no symptoms reported


Musculoskeletal:  muscle pain


Skin:  no symptoms reported


Psychiatric/Neurological:  No Symptoms Reported


Hematologic/Lymphatic:  No Symptoms Reported





Past Medical-Social-Family Hx


Patient Social History


Alcohol Use:  Denies Use


Recreational Drug Use:  No


Smoking Status:  Never a Smoker


2nd Hand Smoke Exposure:  No


Recent Foreign Travel:  No


Contact w/Someone Who Travel:  No


Recent Infectious Disease Expo:  No


Recent Hopitalizations:  No





Immunizations Up To Date


Date of Pneumonia Vaccine:  Oct 1, 2011





Seasonal Allergies


Seasonal Allergies:  No





Surgeries


History of Surgeries:  Yes


Surgeries:  Hysterectomy, Tonsillectomy





Respiratory


History of Respiratory Disorde:  No





Cardiovascular


History of Cardiac Disorders:  Yes


Cardiac Disorders:  High Cholesterol, Hypertension





Neurological


History of Neurological Disord:  No





Genitourinary


History of Genitourinary Disor:  No





Gastrointestinal


History of Gastrointestinal Di:  No





Musculoskeletal


History of Musculoskeletal Dis:  No





Endocrine


History of Endocrine Disorders:  No





HEENT


History of HEENT Disorders:  No





Cancer


History of Cancer:  No





Psychosocial


History of Psychiatric Problem:  No





Integumentary


History of Skin or Integumenta:  No





Physical Exam


Vital Signs





Vital Signs - First Documented








 3/11/18





 06:25


 


Temp 99.1


 


Pulse 76


 


Resp 16


 


B/P (MAP) 138/76 (96)


 


Pulse Ox 97


 


O2 Delivery Room Air





Capillary Refill : Less Than 3 Seconds


General Appearance:  mild distress


Eyes:  Bilateral Eye Normal Inspection


HEENT:  pharyngeal erythema


Neck:  full range of motion


Respiratory:  chest non-tender, lungs clear, normal breath sounds, no 

respiratory distress, no accessory muscle use, respiratory distress


Cardiovascular:  normal peripheral pulses, regular rate, rhythm, no edema, no 

gallop, no JVD, no murmur


Gastrointestinal:  normal bowel sounds, non tender, soft, no organomegaly, no 

pulsatile mass


Neurologic/Psychiatric:  CNs II-XII nml as tested, no motor/sensory deficits, 

alert, normal mood/affect, oriented x 3


Skin:  normal color, warm/dry, cyanosis, cool, diaphoresis, damp


Lymphatic:  no adenopathy





Progress/Results/Core Measures


Suspected Sepsis


Recent Fever Within 48 Hours:  No


Infection Criteria Present:  None


New/Unexplained  Altered Menta:  No


Sepsis Screen:  No Definite Risk


Sepsis Diagnosis:  


SIRS


Temperature:99.1 


Pulse: 76 


Respiratory Rate: 16


 


Laboratory Tests


3/11/18 06:50: White Blood Count 8.2


Blood Pressure 138 /76 


Mean: 96


 


Laboratory Tests


3/11/18 06:50: 


Creatinine 1.05, Platelet Count 152, Total Bilirubin 0.7








Results/Orders


Lab Results





Laboratory Tests








Test


  3/11/18


06:50 Range/Units


 


 


White Blood Count


  8.2 


  4.3-11.0


10^3/uL


 


Red Blood Count


  4.31 L


  4.35-5.85


10^6/uL


 


Hemoglobin 12.9  11.5-16.0  G/DL


 


Hematocrit 38  35-52  %


 


Mean Corpuscular Volume 88  80-99  FL


 


Mean Corpuscular Hemoglobin 30  25-34  PG


 


Mean Corpuscular Hemoglobin


Concent 34 


  32-36  G/DL


 


 


Red Cell Distribution Width 13.1  10.0-14.5  %


 


Platelet Count


  152 


  130-400


10^3/uL


 


Mean Platelet Volume 10.8 H 7.4-10.4  FL


 


Neutrophils (%) (Auto) 83 H 42-75  %


 


Lymphocytes (%) (Auto) 8 L 12-44  %


 


Monocytes (%) (Auto) 7  0-12  %


 


Eosinophils (%) (Auto) 2  0-10  %


 


Basophils (%) (Auto) 0  0-10  %


 


Neutrophils # (Auto) 6.8  1.8-7.8  X 10^3


 


Lymphocytes # (Auto) 0.7 L 1.0-4.0  X 10^3


 


Monocytes # (Auto) 0.6  0.0-1.0  X 10^3


 


Eosinophils # (Auto)


  0.1 


  0.0-0.3


10^3/uL


 


Basophils # (Auto)


  0.0 


  0.0-0.1


10^3/uL


 


Sodium Level 140  135-145  MMOL/L


 


Potassium Level 3.7  3.6-5.0  MMOL/L


 


Chloride Level 104    MMOL/L


 


Carbon Dioxide Level 27  21-32  MMOL/L


 


Anion Gap 9  5-14  MMOL/L


 


Blood Urea Nitrogen 18  7-18  MG/DL


 


Creatinine


  1.05 


  0.60-1.30


MG/DL


 


Estimat Glomerular Filtration


Rate 51 


   


 


 


BUN/Creatinine Ratio 17   


 


Glucose Level 114 H   MG/DL


 


Calcium Level 9.8  8.5-10.1  MG/DL


 


Total Bilirubin 0.7  0.1-1.0  MG/DL


 


Aspartate Amino Transf


(AST/SGOT) 24 


  5-34  U/L


 


 


Alanine Aminotransferase


(ALT/SGPT) 20 


  0-55  U/L


 


 


Alkaline Phosphatase 116    U/L


 


Total Protein 7.8  6.4-8.2  GM/DL


 


Albumin 4.3  3.2-4.5  GM/DL








Micro Results





Microbiology


3/11/18 Influenza Types A,B Antigen ([facility]) - Final, Complete


          





My Orders





Orders - KAM DOOLEY MD


Cbc With Automated Diff (3/11/18 06:37)


Comprehensive Metabolic Panel (3/11/18 06:37)


Ua Culture If Indicated (3/11/18 06:37)


Influenza A And B Antigens (3/11/18 06:37)


Chest 1 View, Ap/Pa Only (3/11/18 07:17)





Vital Signs/I&O





Vital Sign - Last 12Hours








 3/11/18





 06:25


 


Temp 99.1


 


Pulse 76


 


Resp 16


 


B/P (MAP) 138/76 (96)


 


Pulse Ox 97


 


O2 Delivery Room Air





Capillary Refill : Less Than 3 Seconds








Blood Pressure Mean:  96











Departure


Impression


Impression:  


 Primary Impression:  


 Upper respiratory infection


Disposition:  01 HOME, SELF-CARE


Condition:  Stable/Unchanged





Departure-Patient Inst.


Referrals:  


LORIE AQUINO MD (PCP/Family)


Primary Care Physician


Patient Instructions:  Viral Upper Respiratory Infection, Adult (DC)





Add. Discharge Instructions:  


All discharge instructions reviewed with patient and/or family. Voiced 

understanding.


Although you tested negative for the flu, the test is not infallible and you 

may indeed have the flu.  Treatment is symptomatic which is to say throat 

lozenges or gargles for the throat pain.  In addition if the fever is greater 

than 100.5 or there is significant body aching the use of Tylenol 650 mg or 

ibuprofen 600 mg it is useful.  Rest and lots of fluids are in order.  This can 

take a week or more to resolve.  If symptoms increase return here or see her 

provider.











KAM DOOLEY MD Mar 11, 2018 08:33

## 2018-06-12 ENCOUNTER — HOSPITAL ENCOUNTER (OUTPATIENT)
Dept: HOSPITAL 75 - RAD | Age: 73
End: 2018-06-12
Attending: NURSE PRACTITIONER
Payer: MEDICARE

## 2018-06-12 DIAGNOSIS — M47.812: Primary | ICD-10-CM

## 2018-06-12 DIAGNOSIS — M50.30: ICD-10-CM

## 2018-06-12 PROCEDURE — 72040 X-RAY EXAM NECK SPINE 2-3 VW: CPT

## 2018-06-12 NOTE — DIAGNOSTIC IMAGING REPORT
INDICATION: Neck and bilateral arm pain x6 days. No known

injury..



TECHNIQUE:  AP, lateral and odontoid views cervical spine..



CORRELATION STUDY:  None



FINDINGS:  

Alignment relatively anatomic. Slight loss of height at the C5

level. Slight irregular sloping of the superior C7 level. Rather

prominent disc space narrowing at C5-C6, C6-C7 and, to lesser

degree, C7-T1 levels. Prominent osteophytes, particularly

anteriorly, are noted. There is however slightly more prominent

osteophyte at the posterior inferior C5-C6 level, likely

predisposing potential foraminal and/or canal narrowing. Odontoid

intact. Lateral masses at C1-C2 aligned. Asymmetric areas of

hypertrophic facet arthropathy are suggested. Very mildly

prominent C7 cervical ribs. Prevertebral soft tissues are

unremarkable.     



IMPRESSION:

1. Negative for acute findings of the cervical spine.

2. There is rather prominent multilevel asymmetric areas of

cervical spondylosis. Disc space narrowing and osteophyte

formation does result in the potential for foraminal and/or canal

narrowing. If further assessment is desired, MRI may be of

additional benefit.



Dictated by: 



  Dictated on workstation # [id number]

## 2018-06-18 ENCOUNTER — HOSPITAL ENCOUNTER (OUTPATIENT)
Dept: HOSPITAL 75 - RAD | Age: 73
End: 2018-06-18
Attending: NURSE PRACTITIONER
Payer: MEDICARE

## 2018-06-18 DIAGNOSIS — M99.71: ICD-10-CM

## 2018-06-18 DIAGNOSIS — M48.02: Primary | ICD-10-CM

## 2018-06-18 DIAGNOSIS — M47.812: ICD-10-CM

## 2018-06-18 DIAGNOSIS — M50.322: ICD-10-CM

## 2018-06-18 PROCEDURE — 72141 MRI NECK SPINE W/O DYE: CPT

## 2018-06-18 NOTE — DIAGNOSTIC IMAGING REPORT
PROCEDURE: MR imaging cervical spine without contrast.



TECHNIQUE: Multiplanar, multisequence MR imaging of the cervical

spine was performed without contrast.



INDICATION: Bilateral shoulder pain and arm pain as well as

sternum and chest pain.



No prior MRI of the cervical spine is available for comparison.



Curvature and alignment of the cervical spine is normal. The

vertebral body marrow signal is normal. No geographic marrow

lesion is seen. There is multilevel degenerative disc disease

apparent with disc space narrowing and desiccation, greatest at

C5-C6 and C6-C7 levels. Cervical cord does show homogeneous

signal intensity. No abnormal cord signal is detected.



C2-C3: Central canal and neural foramina are widely patent.



C3-C4: Broad-based endplate osteophytes are present but no

significant central canal or neuroforaminal stenosis is

identified.



C4-C5: There is some wide based midline/right paramidline

disc/osteophyte complex that indents the ventral thecal sac. This

does produce some mild narrowing of the central canal. The no

significant neural foraminal stenosis is seen.



C5-C6: Broad-based disc/osteophyte complex and uncovertebral

joint degenerative change does result in mild central canal

narrowing. There is significant bilateral neural foraminal

stenosis.



C6-C7: End-plate osteophytes and uncovertebral joint degenerative

change results in moderate bilateral neural foraminal stenosis.

Central canal is patent.



C7-T1: Central canal and neural foramina appear patent.



IMPRESSION: Multilevel cervical spondylosis with multilevel

central canal and neuroforaminal stenosis described level by

level above.



Dictated by: 



  Dictated on workstation # [id number]

## 2019-03-21 ENCOUNTER — HOSPITAL ENCOUNTER (OUTPATIENT)
Dept: HOSPITAL 75 - PREOP | Age: 74
Discharge: HOME | End: 2019-03-21
Attending: OBSTETRICS & GYNECOLOGY
Payer: MEDICARE

## 2019-03-21 VITALS — DIASTOLIC BLOOD PRESSURE: 51 MMHG | SYSTOLIC BLOOD PRESSURE: 127 MMHG

## 2019-03-21 VITALS — WEIGHT: 186 LBS | BODY MASS INDEX: 34.23 KG/M2 | HEIGHT: 62 IN

## 2019-03-21 DIAGNOSIS — Z79.01: ICD-10-CM

## 2019-03-21 DIAGNOSIS — Z11.2: ICD-10-CM

## 2019-03-21 DIAGNOSIS — Z01.812: Primary | ICD-10-CM

## 2019-03-21 DIAGNOSIS — N95.0: ICD-10-CM

## 2019-03-21 LAB
BASOPHILS # BLD AUTO: 0 10^3/UL (ref 0–0.1)
BASOPHILS NFR BLD AUTO: 0 % (ref 0–10)
BUN/CREAT SERPL: 18
CALCIUM SERPL-MCNC: 9.4 MG/DL (ref 8.5–10.1)
CHLORIDE SERPL-SCNC: 107 MMOL/L (ref 98–107)
CO2 SERPL-SCNC: 27 MMOL/L (ref 21–32)
CREAT SERPL-MCNC: 1.44 MG/DL (ref 0.6–1.3)
EOSINOPHIL # BLD AUTO: 0.2 10^3/UL (ref 0–0.3)
EOSINOPHIL NFR BLD AUTO: 2 % (ref 0–10)
ERYTHROCYTE [DISTWIDTH] IN BLOOD BY AUTOMATED COUNT: 13.3 % (ref 10–14.5)
GFR SERPLBLD BASED ON 1.73 SQ M-ARVRAT: 36 ML/MIN
GLUCOSE SERPL-MCNC: 106 MG/DL (ref 70–105)
HCT VFR BLD CALC: 34 % (ref 35–52)
HGB BLD-MCNC: 11.2 G/DL (ref 11.5–16)
LYMPHOCYTES # BLD AUTO: 1.5 X 10^3 (ref 1–4)
LYMPHOCYTES NFR BLD AUTO: 17 % (ref 12–44)
MANUAL DIFFERENTIAL PERFORMED BLD QL: NO
MCH RBC QN AUTO: 30 PG (ref 25–34)
MCHC RBC AUTO-ENTMCNC: 33 G/DL (ref 32–36)
MCV RBC AUTO: 89 FL (ref 80–99)
MONOCYTES # BLD AUTO: 0.5 X 10^3 (ref 0–1)
MONOCYTES NFR BLD AUTO: 6 % (ref 0–12)
NEUTROPHILS # BLD AUTO: 6.8 X 10^3 (ref 1.8–7.8)
NEUTROPHILS NFR BLD AUTO: 75 % (ref 42–75)
PLATELET # BLD: 170 10^3/UL (ref 130–400)
PMV BLD AUTO: 10.6 FL (ref 7.4–10.4)
POTASSIUM SERPL-SCNC: 3.5 MMOL/L (ref 3.6–5)
SODIUM SERPL-SCNC: 143 MMOL/L (ref 135–145)
WBC # BLD AUTO: 9 10^3/UL (ref 4.3–11)

## 2019-03-21 PROCEDURE — 80048 BASIC METABOLIC PNL TOTAL CA: CPT

## 2019-03-21 PROCEDURE — 86850 RBC ANTIBODY SCREEN: CPT

## 2019-03-21 PROCEDURE — 86901 BLOOD TYPING SEROLOGIC RH(D): CPT

## 2019-03-21 PROCEDURE — 87081 CULTURE SCREEN ONLY: CPT

## 2019-03-21 PROCEDURE — 36415 COLL VENOUS BLD VENIPUNCTURE: CPT

## 2019-03-21 PROCEDURE — 86900 BLOOD TYPING SEROLOGIC ABO: CPT

## 2019-03-21 PROCEDURE — 85025 COMPLETE CBC W/AUTO DIFF WBC: CPT

## 2019-03-26 ENCOUNTER — HOSPITAL ENCOUNTER (INPATIENT)
Dept: HOSPITAL 75 - SDC | Age: 74
LOS: 15 days | Discharge: ANOTHER HOSPITAL | End: 2019-04-10
Payer: MEDICARE

## 2019-03-26 DIAGNOSIS — R45.1: ICD-10-CM

## 2019-03-26 DIAGNOSIS — D64.9: ICD-10-CM

## 2019-03-26 DIAGNOSIS — K66.8: ICD-10-CM

## 2019-03-26 DIAGNOSIS — N95.0: ICD-10-CM

## 2019-03-26 DIAGNOSIS — E87.1: ICD-10-CM

## 2019-03-26 DIAGNOSIS — E78.00: ICD-10-CM

## 2019-03-26 DIAGNOSIS — S37.23XA: ICD-10-CM

## 2019-03-26 DIAGNOSIS — K91.89: ICD-10-CM

## 2019-03-26 DIAGNOSIS — K56.7: ICD-10-CM

## 2019-03-26 DIAGNOSIS — J96.00: ICD-10-CM

## 2019-03-26 DIAGNOSIS — R19.7: ICD-10-CM

## 2019-03-26 DIAGNOSIS — N17.9: ICD-10-CM

## 2019-03-26 DIAGNOSIS — E83.42: ICD-10-CM

## 2019-03-26 DIAGNOSIS — S36.438A: ICD-10-CM

## 2019-03-26 DIAGNOSIS — N99.72: ICD-10-CM

## 2019-03-26 DIAGNOSIS — M32.9: ICD-10-CM

## 2019-03-26 DIAGNOSIS — N32.2: ICD-10-CM

## 2019-03-26 DIAGNOSIS — K91.72: Primary | ICD-10-CM

## 2019-03-26 DIAGNOSIS — I12.9: ICD-10-CM

## 2019-03-26 DIAGNOSIS — N18.9: ICD-10-CM

## 2019-03-26 DIAGNOSIS — E86.0: ICD-10-CM

## 2019-03-26 DIAGNOSIS — A41.9: ICD-10-CM

## 2019-03-26 DIAGNOSIS — N94.89: ICD-10-CM

## 2019-03-26 DIAGNOSIS — B37.0: ICD-10-CM

## 2019-03-26 DIAGNOSIS — Z90.711: ICD-10-CM

## 2019-03-26 DIAGNOSIS — J98.11: ICD-10-CM

## 2019-03-26 DIAGNOSIS — R60.0: ICD-10-CM

## 2019-03-26 DIAGNOSIS — N95.2: ICD-10-CM

## 2019-03-26 DIAGNOSIS — I25.10: ICD-10-CM

## 2019-03-26 DIAGNOSIS — E87.6: ICD-10-CM

## 2019-03-26 PROCEDURE — 0TQB0ZZ REPAIR BLADDER, OPEN APPROACH: ICD-10-PCS

## 2019-03-26 PROCEDURE — 0UNG4ZZ RELEASE VAGINA, PERCUTANEOUS ENDOSCOPIC APPROACH: ICD-10-PCS

## 2019-03-26 PROCEDURE — 0U9MXZZ DRAINAGE OF VULVA, EXTERNAL APPROACH: ICD-10-PCS

## 2019-03-26 PROCEDURE — 0DBB0ZZ EXCISION OF ILEUM, OPEN APPROACH: ICD-10-PCS

## 2019-03-26 PROCEDURE — 0UQG7ZZ REPAIR VAGINA, VIA NATURAL OR ARTIFICIAL OPENING: ICD-10-PCS

## 2019-03-26 PROCEDURE — 8E0W4CZ ROBOTIC ASSISTED PROCEDURE OF TRUNK REGION, PERCUTANEOUS ENDOSCOPIC APPROACH: ICD-10-PCS
